# Patient Record
Sex: FEMALE | Race: ASIAN | NOT HISPANIC OR LATINO
[De-identification: names, ages, dates, MRNs, and addresses within clinical notes are randomized per-mention and may not be internally consistent; named-entity substitution may affect disease eponyms.]

---

## 2017-01-21 ENCOUNTER — APPOINTMENT (OUTPATIENT)
Dept: INTERNAL MEDICINE | Facility: CLINIC | Age: 62
End: 2017-01-21

## 2017-01-21 ENCOUNTER — LABORATORY RESULT (OUTPATIENT)
Age: 62
End: 2017-01-21

## 2017-01-21 VITALS
DIASTOLIC BLOOD PRESSURE: 70 MMHG | SYSTOLIC BLOOD PRESSURE: 98 MMHG | WEIGHT: 101 LBS | BODY MASS INDEX: 19.07 KG/M2 | HEIGHT: 61 IN | TEMPERATURE: 97.8 F

## 2017-01-27 LAB
ALBUMIN SERPL ELPH-MCNC: 4.4 G/DL
ALP BLD-CCNC: 50 U/L
ALT SERPL-CCNC: 11 U/L
ANION GAP SERPL CALC-SCNC: 18 MMOL/L
AST SERPL-CCNC: 13 U/L
BILIRUB SERPL-MCNC: 0.3 MG/DL
BUN SERPL-MCNC: 19 MG/DL
CALCIUM SERPL-MCNC: 9.9 MG/DL
CHLORIDE SERPL-SCNC: 101 MMOL/L
CO2 SERPL-SCNC: 23 MMOL/L
CREAT SERPL-MCNC: 0.69 MG/DL
GLUCOSE SERPL-MCNC: 119 MG/DL
HBA1C MFR BLD HPLC: 6.7 %
POTASSIUM SERPL-SCNC: 4.6 MMOL/L
PROT SERPL-MCNC: 7.5 G/DL
SODIUM SERPL-SCNC: 142 MMOL/L
T4 FREE SERPL-MCNC: 1.6 NG/DL
TSH SERPL-ACNC: 1.88 UIU/ML

## 2017-03-04 ENCOUNTER — APPOINTMENT (OUTPATIENT)
Dept: INTERNAL MEDICINE | Facility: CLINIC | Age: 62
End: 2017-03-04

## 2017-04-22 ENCOUNTER — APPOINTMENT (OUTPATIENT)
Dept: INTERNAL MEDICINE | Facility: CLINIC | Age: 62
End: 2017-04-22

## 2017-04-22 ENCOUNTER — LABORATORY RESULT (OUTPATIENT)
Age: 62
End: 2017-04-22

## 2017-04-22 VITALS
DIASTOLIC BLOOD PRESSURE: 70 MMHG | HEIGHT: 61 IN | WEIGHT: 100 LBS | BODY MASS INDEX: 18.88 KG/M2 | SYSTOLIC BLOOD PRESSURE: 112 MMHG

## 2017-04-26 LAB
ALBUMIN SERPL ELPH-MCNC: 4.3 G/DL
ALP BLD-CCNC: 53 U/L
ALT SERPL-CCNC: 17 U/L
ANION GAP SERPL CALC-SCNC: 14 MMOL/L
AST SERPL-CCNC: 18 U/L
BILIRUB SERPL-MCNC: 0.3 MG/DL
BUN SERPL-MCNC: 11 MG/DL
CALCIUM SERPL-MCNC: 9.9 MG/DL
CHLORIDE SERPL-SCNC: 105 MMOL/L
CO2 SERPL-SCNC: 24 MMOL/L
CREAT SERPL-MCNC: 0.58 MG/DL
GLUCOSE SERPL-MCNC: 96 MG/DL
HBA1C MFR BLD HPLC: 6.5 %
POTASSIUM SERPL-SCNC: 4.6 MMOL/L
PROT SERPL-MCNC: 7.3 G/DL
SODIUM SERPL-SCNC: 144 MMOL/L
T4 FREE SERPL-MCNC: 2.1 NG/DL
TSH SERPL-ACNC: 0.03 UIU/ML

## 2017-05-18 ENCOUNTER — LABORATORY RESULT (OUTPATIENT)
Age: 62
End: 2017-05-18

## 2017-05-18 ENCOUNTER — APPOINTMENT (OUTPATIENT)
Dept: INTERNAL MEDICINE | Facility: CLINIC | Age: 62
End: 2017-05-18

## 2017-05-18 VITALS
TEMPERATURE: 97.4 F | BODY MASS INDEX: 18.69 KG/M2 | SYSTOLIC BLOOD PRESSURE: 90 MMHG | DIASTOLIC BLOOD PRESSURE: 60 MMHG | WEIGHT: 99 LBS | HEIGHT: 61 IN

## 2017-05-19 LAB
ALBUMIN SERPL ELPH-MCNC: 4.3 G/DL
ALP BLD-CCNC: 53 U/L
ALT SERPL-CCNC: 14 U/L
ANION GAP SERPL CALC-SCNC: 19 MMOL/L
AST SERPL-CCNC: 21 U/L
BASOPHILS # BLD AUTO: 0.06 K/UL
BASOPHILS NFR BLD AUTO: 0.7 %
BILIRUB SERPL-MCNC: 0.2 MG/DL
BUN SERPL-MCNC: 14 MG/DL
CALCIUM SERPL-MCNC: 9.4 MG/DL
CHLORIDE SERPL-SCNC: 100 MMOL/L
CO2 SERPL-SCNC: 21 MMOL/L
CREAT SERPL-MCNC: 0.58 MG/DL
EOSINOPHIL # BLD AUTO: 0.71 K/UL
EOSINOPHIL NFR BLD AUTO: 7.8 %
GLUCOSE SERPL-MCNC: 170 MG/DL
HCT VFR BLD CALC: 38.2 %
HGB BLD-MCNC: 12.5 G/DL
IMM GRANULOCYTES NFR BLD AUTO: 0.1 %
LYMPHOCYTES # BLD AUTO: 2.85 K/UL
LYMPHOCYTES NFR BLD AUTO: 31.1 %
MAN DIFF?: NORMAL
MCHC RBC-ENTMCNC: 31.8 PG
MCHC RBC-ENTMCNC: 32.7 GM/DL
MCV RBC AUTO: 97.2 FL
MONOCYTES # BLD AUTO: 0.61 K/UL
MONOCYTES NFR BLD AUTO: 6.7 %
NEUTROPHILS # BLD AUTO: 4.91 K/UL
NEUTROPHILS NFR BLD AUTO: 53.6 %
PLATELET # BLD AUTO: 349 K/UL
POTASSIUM SERPL-SCNC: 4.5 MMOL/L
PROT SERPL-MCNC: 7.1 G/DL
RBC # BLD: 3.93 M/UL
RBC # FLD: 11.8 %
SODIUM SERPL-SCNC: 140 MMOL/L
WBC # FLD AUTO: 9.15 K/UL

## 2017-06-13 ENCOUNTER — CLINICAL ADVICE (OUTPATIENT)
Age: 62
End: 2017-06-13

## 2017-06-13 ENCOUNTER — APPOINTMENT (OUTPATIENT)
Dept: INTERNAL MEDICINE | Facility: CLINIC | Age: 62
End: 2017-06-13

## 2017-06-13 ENCOUNTER — LABORATORY RESULT (OUTPATIENT)
Age: 62
End: 2017-06-13

## 2017-06-13 VITALS
DIASTOLIC BLOOD PRESSURE: 70 MMHG | WEIGHT: 95 LBS | SYSTOLIC BLOOD PRESSURE: 106 MMHG | HEIGHT: 61 IN | BODY MASS INDEX: 17.94 KG/M2

## 2017-06-19 LAB
ALBUMIN SERPL ELPH-MCNC: 4.4 G/DL
ALP BLD-CCNC: 56 U/L
ALT SERPL-CCNC: 19 U/L
ANION GAP SERPL CALC-SCNC: 16 MMOL/L
AST SERPL-CCNC: 27 U/L
BASOPHILS # BLD AUTO: 0.1 K/UL
BASOPHILS NFR BLD AUTO: 1.4 %
BILIRUB SERPL-MCNC: 0.3 MG/DL
BUN SERPL-MCNC: 13 MG/DL
CALCIUM SERPL-MCNC: 10.1 MG/DL
CHLORIDE SERPL-SCNC: 102 MMOL/L
CHOLEST SERPL-MCNC: 143 MG/DL
CHOLEST/HDLC SERPL: 3 RATIO
CO2 SERPL-SCNC: 24 MMOL/L
CREAT SERPL-MCNC: 0.67 MG/DL
CREAT SPEC-SCNC: 54 MG/DL
EOSINOPHIL # BLD AUTO: 0.52 K/UL
EOSINOPHIL NFR BLD AUTO: 7.5 %
GLUCOSE SERPL-MCNC: 90 MG/DL
HBA1C MFR BLD HPLC: 6.9 %
HCT VFR BLD CALC: 38.3 %
HDLC SERPL-MCNC: 47 MG/DL
HGB BLD-MCNC: 12.4 G/DL
IMM GRANULOCYTES NFR BLD AUTO: 0 %
LDLC SERPL CALC-MCNC: 78 MG/DL
LYMPHOCYTES # BLD AUTO: 3.04 K/UL
LYMPHOCYTES NFR BLD AUTO: 43.8 %
MAN DIFF?: NORMAL
MCHC RBC-ENTMCNC: 31.3 PG
MCHC RBC-ENTMCNC: 32.4 GM/DL
MCV RBC AUTO: 96.7 FL
MICROALBUMIN 24H UR DL<=1MG/L-MCNC: 0.3 MG/DL
MICROALBUMIN/CREAT 24H UR-RTO: 6 MG/G
MONOCYTES # BLD AUTO: 0.58 K/UL
MONOCYTES NFR BLD AUTO: 8.4 %
NEUTROPHILS # BLD AUTO: 2.7 K/UL
NEUTROPHILS NFR BLD AUTO: 38.9 %
PLATELET # BLD AUTO: 360 K/UL
POTASSIUM SERPL-SCNC: 4.6 MMOL/L
PROT SERPL-MCNC: 7.6 G/DL
RBC # BLD: 3.96 M/UL
RBC # FLD: 11.9 %
SODIUM SERPL-SCNC: 142 MMOL/L
T4 FREE SERPL-MCNC: 1.2 NG/DL
TRIGL SERPL-MCNC: 89 MG/DL
TSH SERPL-ACNC: 3.03 UIU/ML
WBC # FLD AUTO: 6.94 K/UL

## 2017-09-12 ENCOUNTER — LABORATORY RESULT (OUTPATIENT)
Age: 62
End: 2017-09-12

## 2017-09-12 ENCOUNTER — APPOINTMENT (OUTPATIENT)
Dept: INTERNAL MEDICINE | Facility: CLINIC | Age: 62
End: 2017-09-12
Payer: MEDICAID

## 2017-09-12 VITALS
BODY MASS INDEX: 18.5 KG/M2 | HEIGHT: 61 IN | SYSTOLIC BLOOD PRESSURE: 98 MMHG | WEIGHT: 98 LBS | DIASTOLIC BLOOD PRESSURE: 70 MMHG

## 2017-09-12 PROCEDURE — 99213 OFFICE O/P EST LOW 20 MIN: CPT | Mod: 25

## 2017-09-12 PROCEDURE — 36415 COLL VENOUS BLD VENIPUNCTURE: CPT

## 2017-09-12 PROCEDURE — 90688 IIV4 VACCINE SPLT 0.5 ML IM: CPT

## 2017-09-12 PROCEDURE — G0008: CPT

## 2017-09-17 LAB
HBA1C MFR BLD HPLC: 6.5 %
T4 FREE SERPL-MCNC: 1.3 NG/DL
TSH SERPL-ACNC: 2.87 UIU/ML

## 2017-11-13 ENCOUNTER — RX RENEWAL (OUTPATIENT)
Age: 62
End: 2017-11-13

## 2018-01-09 ENCOUNTER — APPOINTMENT (OUTPATIENT)
Dept: INTERNAL MEDICINE | Facility: CLINIC | Age: 63
End: 2018-01-09
Payer: MEDICAID

## 2018-01-09 ENCOUNTER — LABORATORY RESULT (OUTPATIENT)
Age: 63
End: 2018-01-09

## 2018-01-09 VITALS
BODY MASS INDEX: 18.5 KG/M2 | HEIGHT: 61 IN | DIASTOLIC BLOOD PRESSURE: 58 MMHG | WEIGHT: 98 LBS | SYSTOLIC BLOOD PRESSURE: 90 MMHG

## 2018-01-09 PROCEDURE — 99213 OFFICE O/P EST LOW 20 MIN: CPT | Mod: 25

## 2018-01-09 PROCEDURE — 36415 COLL VENOUS BLD VENIPUNCTURE: CPT

## 2018-01-12 LAB
ALBUMIN SERPL ELPH-MCNC: 4.3 G/DL
ALP BLD-CCNC: 58 U/L
ALT SERPL-CCNC: 17 U/L
ANION GAP SERPL CALC-SCNC: 13 MMOL/L
AST SERPL-CCNC: 19 U/L
BILIRUB SERPL-MCNC: 0.2 MG/DL
BUN SERPL-MCNC: 13 MG/DL
CALCIUM SERPL-MCNC: 9.9 MG/DL
CHLORIDE SERPL-SCNC: 101 MMOL/L
CO2 SERPL-SCNC: 27 MMOL/L
CREAT SERPL-MCNC: 0.64 MG/DL
GLUCOSE SERPL-MCNC: 104 MG/DL
HBA1C MFR BLD HPLC: 6.9 %
POTASSIUM SERPL-SCNC: 4.1 MMOL/L
PROT SERPL-MCNC: 7.9 G/DL
SODIUM SERPL-SCNC: 141 MMOL/L
T4 FREE SERPL-MCNC: 1.2 NG/DL
TSH SERPL-ACNC: 6.19 UIU/ML

## 2018-04-22 ENCOUNTER — RX RENEWAL (OUTPATIENT)
Age: 63
End: 2018-04-22

## 2018-04-23 ENCOUNTER — RX RENEWAL (OUTPATIENT)
Age: 63
End: 2018-04-23

## 2018-05-17 ENCOUNTER — LABORATORY RESULT (OUTPATIENT)
Age: 63
End: 2018-05-17

## 2018-05-17 ENCOUNTER — APPOINTMENT (OUTPATIENT)
Dept: INTERNAL MEDICINE | Facility: CLINIC | Age: 63
End: 2018-05-17
Payer: MEDICAID

## 2018-05-17 VITALS
WEIGHT: 98 LBS | SYSTOLIC BLOOD PRESSURE: 90 MMHG | DIASTOLIC BLOOD PRESSURE: 50 MMHG | HEIGHT: 61 IN | BODY MASS INDEX: 18.5 KG/M2

## 2018-05-17 PROCEDURE — 36415 COLL VENOUS BLD VENIPUNCTURE: CPT

## 2018-05-17 PROCEDURE — 99213 OFFICE O/P EST LOW 20 MIN: CPT | Mod: 25

## 2018-05-17 NOTE — PHYSICAL EXAM
[No Acute Distress] : no acute distress [Well Nourished] : well nourished [Well Developed] : well developed [Well-Appearing] : well-appearing [Normal Outer Ear/Nose] : the outer ears and nose were normal in appearance [Supple] : supple [No Respiratory Distress] : no respiratory distress  [No Edema] : there was no peripheral edema [Normal Gait] : normal gait [Normal Affect] : the affect was normal [Normal Mood] : the mood was normal [Right Foot Was Examined] : Right foot ~C was examined [Left Foot Was Examined] : left foot ~C was examined [None] : no ulcers in either foot were found

## 2018-05-17 NOTE — ASSESSMENT
[FreeTextEntry1] : Continue same thyroid medication.  TFT.\par Continue same  diabetes medication.  Hemoglobin A1C.  urine albumin/creat.

## 2018-05-28 LAB
ALBUMIN SERPL ELPH-MCNC: 4 G/DL
ALP BLD-CCNC: 49 U/L
ALT SERPL-CCNC: 11 U/L
ANION GAP SERPL CALC-SCNC: 12 MMOL/L
AST SERPL-CCNC: 18 U/L
BILIRUB SERPL-MCNC: 0.3 MG/DL
BUN SERPL-MCNC: 20 MG/DL
CALCIUM SERPL-MCNC: 9.6 MG/DL
CHLORIDE SERPL-SCNC: 101 MMOL/L
CO2 SERPL-SCNC: 25 MMOL/L
CREAT SERPL-MCNC: 0.56 MG/DL
CREAT SPEC-SCNC: 48 MG/DL
GLUCOSE SERPL-MCNC: 106 MG/DL
HBA1C MFR BLD HPLC: 7.1 %
MICROALBUMIN 24H UR DL<=1MG/L-MCNC: 0.4 MG/DL
MICROALBUMIN/CREAT 24H UR-RTO: 8 MG/G
POTASSIUM SERPL-SCNC: 4.5 MMOL/L
PROT SERPL-MCNC: 7.7 G/DL
SODIUM SERPL-SCNC: 138 MMOL/L
T4 FREE SERPL-MCNC: 1.4 NG/DL
TSH SERPL-ACNC: 2.27 UIU/ML

## 2018-06-19 ENCOUNTER — RX RENEWAL (OUTPATIENT)
Age: 63
End: 2018-06-19

## 2018-08-16 ENCOUNTER — RX RENEWAL (OUTPATIENT)
Age: 63
End: 2018-08-16

## 2018-09-20 ENCOUNTER — LABORATORY RESULT (OUTPATIENT)
Age: 63
End: 2018-09-20

## 2018-09-20 ENCOUNTER — APPOINTMENT (OUTPATIENT)
Dept: INTERNAL MEDICINE | Facility: CLINIC | Age: 63
End: 2018-09-20
Payer: MEDICAID

## 2018-09-20 VITALS
HEIGHT: 61 IN | HEART RATE: 61 BPM | BODY MASS INDEX: 19.07 KG/M2 | DIASTOLIC BLOOD PRESSURE: 40 MMHG | WEIGHT: 101 LBS | SYSTOLIC BLOOD PRESSURE: 120 MMHG

## 2018-09-20 PROCEDURE — 99396 PREV VISIT EST AGE 40-64: CPT | Mod: 25

## 2018-09-20 PROCEDURE — 90686 IIV4 VACC NO PRSV 0.5 ML IM: CPT

## 2018-09-20 PROCEDURE — 93000 ELECTROCARDIOGRAM COMPLETE: CPT

## 2018-09-20 PROCEDURE — 36415 COLL VENOUS BLD VENIPUNCTURE: CPT

## 2018-09-20 PROCEDURE — 94010 BREATHING CAPACITY TEST: CPT

## 2018-09-20 PROCEDURE — G0008: CPT

## 2018-09-20 NOTE — HISTORY OF PRESENT ILLNESS
[Spouse] : spouse [FreeTextEntry1] : Diabetes and hypothyroid [de-identified] : no complaints\par

## 2018-09-20 NOTE — PHYSICAL EXAM
[No Acute Distress] : no acute distress [Well Nourished] : well nourished [Well Developed] : well developed [Well-Appearing] : well-appearing [Normal Outer Ear/Nose] : the outer ears and nose were normal in appearance [Supple] : supple [No Respiratory Distress] : no respiratory distress  [Clear to Auscultation] : lungs were clear to auscultation bilaterally [Normal Rate] : normal rate  [Regular Rhythm] : with a regular rhythm [No Edema] : there was no peripheral edema [Soft] : abdomen soft [Non Tender] : non-tender [Normal Posterior Cervical Nodes] : no posterior cervical lymphadenopathy [Normal Anterior Cervical Nodes] : no anterior cervical lymphadenopathy [No CVA Tenderness] : no CVA  tenderness [No Spinal Tenderness] : no spinal tenderness [Normal Gait] : normal gait [Normal Affect] : the affect was normal [Normal Mood] : the mood was normal [Cyanosis] : no cyanosis [Abnormal Temperature] : normal temperature

## 2018-09-20 NOTE — ASSESSMENT
[FreeTextEntry1] : Continue same thyroid medication.  TFT.\par Continue same  diabetes medication.  Hemoglobin A1C.\par

## 2018-09-22 ENCOUNTER — RX RENEWAL (OUTPATIENT)
Age: 63
End: 2018-09-22

## 2018-09-23 LAB
ALBUMIN SERPL ELPH-MCNC: 4.5 G/DL
ALP BLD-CCNC: 58 U/L
ALT SERPL-CCNC: 15 U/L
ANION GAP SERPL CALC-SCNC: 13 MMOL/L
AST SERPL-CCNC: 19 U/L
BASOPHILS # BLD AUTO: 0.08 K/UL
BASOPHILS NFR BLD AUTO: 1.4 %
BILIRUB SERPL-MCNC: 0.3 MG/DL
BUN SERPL-MCNC: 17 MG/DL
CALCIUM SERPL-MCNC: 9.7 MG/DL
CHLORIDE SERPL-SCNC: 99 MMOL/L
CHOLEST SERPL-MCNC: 134 MG/DL
CHOLEST/HDLC SERPL: 3.6 RATIO
CO2 SERPL-SCNC: 27 MMOL/L
CREAT SERPL-MCNC: 0.57 MG/DL
EOSINOPHIL # BLD AUTO: 0.35 K/UL
EOSINOPHIL NFR BLD AUTO: 6.2 %
GLUCOSE SERPL-MCNC: 113 MG/DL
HBA1C MFR BLD HPLC: 7 %
HCT VFR BLD CALC: 37.7 %
HDLC SERPL-MCNC: 37 MG/DL
HGB BLD-MCNC: 12.4 G/DL
IMM GRANULOCYTES NFR BLD AUTO: 0.2 %
LDLC SERPL CALC-MCNC: 72 MG/DL
LYMPHOCYTES # BLD AUTO: 2.16 K/UL
LYMPHOCYTES NFR BLD AUTO: 38.3 %
MAN DIFF?: NORMAL
MCHC RBC-ENTMCNC: 32.2 PG
MCHC RBC-ENTMCNC: 32.9 GM/DL
MCV RBC AUTO: 97.9 FL
MONOCYTES # BLD AUTO: 0.48 K/UL
MONOCYTES NFR BLD AUTO: 8.5 %
NEUTROPHILS # BLD AUTO: 2.56 K/UL
NEUTROPHILS NFR BLD AUTO: 45.4 %
PLATELET # BLD AUTO: 340 K/UL
POTASSIUM SERPL-SCNC: 4.4 MMOL/L
PROT SERPL-MCNC: 7.7 G/DL
RBC # BLD: 3.85 M/UL
RBC # FLD: 12.3 %
SODIUM SERPL-SCNC: 139 MMOL/L
T4 FREE SERPL-MCNC: 1.2 NG/DL
TRIGL SERPL-MCNC: 124 MG/DL
TSH SERPL-ACNC: 3.93 UIU/ML
WBC # FLD AUTO: 5.64 K/UL

## 2018-11-06 ENCOUNTER — EMERGENCY (EMERGENCY)
Facility: HOSPITAL | Age: 63
LOS: 1 days | Discharge: HOME | End: 2018-11-06
Attending: EMERGENCY MEDICINE
Payer: SELF-PAY

## 2018-11-06 VITALS
HEART RATE: 80 BPM | DIASTOLIC BLOOD PRESSURE: 64 MMHG | SYSTOLIC BLOOD PRESSURE: 145 MMHG | TEMPERATURE: 98 F | HEIGHT: 59.06 IN | RESPIRATION RATE: 16 BRPM | OXYGEN SATURATION: 97 % | WEIGHT: 104.94 LBS

## 2018-11-06 PROCEDURE — 99283 EMERGENCY DEPT VISIT LOW MDM: CPT

## 2018-11-06 PROCEDURE — 99284 EMERGENCY DEPT VISIT MOD MDM: CPT
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
## 2018-11-06 NOTE — ED PROVIDER NOTE - PLAN OF CARE
1. follow up with your PMD in 24-48 hours  2. take all home medications as prescribed, keep hydrated  3. if you have any worsening or concerning symptoms dizziness, change in vision slurred speech, changes in sensation, weakness, or changes in balance return to the ED.
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
## 2019-01-17 ENCOUNTER — APPOINTMENT (OUTPATIENT)
Dept: INTERNAL MEDICINE | Facility: CLINIC | Age: 64
End: 2019-01-17
Payer: MEDICAID

## 2019-01-17 ENCOUNTER — LABORATORY RESULT (OUTPATIENT)
Age: 64
End: 2019-01-17

## 2019-01-17 VITALS
SYSTOLIC BLOOD PRESSURE: 76 MMHG | WEIGHT: 102 LBS | BODY MASS INDEX: 19.26 KG/M2 | HEIGHT: 61 IN | DIASTOLIC BLOOD PRESSURE: 58 MMHG

## 2019-01-17 VITALS — DIASTOLIC BLOOD PRESSURE: 60 MMHG | SYSTOLIC BLOOD PRESSURE: 100 MMHG

## 2019-01-17 PROCEDURE — 99214 OFFICE O/P EST MOD 30 MIN: CPT | Mod: 25

## 2019-01-17 PROCEDURE — 36415 COLL VENOUS BLD VENIPUNCTURE: CPT

## 2019-01-17 NOTE — ASSESSMENT
[FreeTextEntry1] : Continue same  diabetes medication.  Hemoglobin A1C.  Want A1C<7.  Patient refuses second diabetes medication.\par Continue same thyroid medication.  TFT.\par RX shingrix

## 2019-01-17 NOTE — PHYSICAL EXAM
[No Acute Distress] : no acute distress [Well Nourished] : well nourished [Well Developed] : well developed [Well-Appearing] : well-appearing [Normal Outer Ear/Nose] : the outer ears and nose were normal in appearance [Supple] : supple [No Respiratory Distress] : no respiratory distress  [Clear to Auscultation] : lungs were clear to auscultation bilaterally [Soft] : abdomen soft [No CVA Tenderness] : no CVA  tenderness [Cyanosis] : no cyanosis [Abnormal Temperature] : normal temperature [Normal Gait] : normal gait [Normal Affect] : the affect was normal [Normal Mood] : the mood was normal

## 2019-01-21 LAB
ALBUMIN SERPL ELPH-MCNC: 4.7 G/DL
ALP BLD-CCNC: 60 U/L
ALT SERPL-CCNC: 16 U/L
ANION GAP SERPL CALC-SCNC: 13 MMOL/L
AST SERPL-CCNC: 21 U/L
BILIRUB SERPL-MCNC: 0.4 MG/DL
BUN SERPL-MCNC: 14 MG/DL
CALCIUM SERPL-MCNC: 10.1 MG/DL
CHLORIDE SERPL-SCNC: 102 MMOL/L
CO2 SERPL-SCNC: 26 MMOL/L
CREAT SERPL-MCNC: 0.64 MG/DL
GLUCOSE SERPL-MCNC: 99 MG/DL
HBA1C MFR BLD HPLC: 7.6 %
POTASSIUM SERPL-SCNC: 4.6 MMOL/L
PROT SERPL-MCNC: 7.7 G/DL
SODIUM SERPL-SCNC: 141 MMOL/L
T4 FREE SERPL-MCNC: 1.3 NG/DL
TSH SERPL-ACNC: 4 UIU/ML

## 2019-02-26 ENCOUNTER — RX RENEWAL (OUTPATIENT)
Age: 64
End: 2019-02-26

## 2019-02-28 ENCOUNTER — RX RENEWAL (OUTPATIENT)
Age: 64
End: 2019-02-28

## 2019-02-28 RX ORDER — LANCETS 33 GAUGE
EACH MISCELLANEOUS
Qty: 100 | Refills: 3 | Status: ACTIVE | COMMUNITY
Start: 2018-09-22 | End: 1900-01-01

## 2019-05-28 ENCOUNTER — LABORATORY RESULT (OUTPATIENT)
Age: 64
End: 2019-05-28

## 2019-05-28 ENCOUNTER — APPOINTMENT (OUTPATIENT)
Dept: INTERNAL MEDICINE | Facility: CLINIC | Age: 64
End: 2019-05-28
Payer: MEDICAID

## 2019-05-28 VITALS
DIASTOLIC BLOOD PRESSURE: 60 MMHG | BODY MASS INDEX: 19.63 KG/M2 | WEIGHT: 104 LBS | HEIGHT: 61 IN | SYSTOLIC BLOOD PRESSURE: 100 MMHG

## 2019-05-28 PROCEDURE — 36415 COLL VENOUS BLD VENIPUNCTURE: CPT

## 2019-05-28 PROCEDURE — 99214 OFFICE O/P EST MOD 30 MIN: CPT | Mod: 25

## 2019-05-28 NOTE — HISTORY OF PRESENT ILLNESS
[FreeTextEntry1] : Diabetes, hypothyroid, and anemia [de-identified] : 6 months of intermittent right sided posterior neck pain and right shoulder pain. No injury. No fever. No swelling.

## 2019-05-28 NOTE — PHYSICAL EXAM
[No Acute Distress] : no acute distress [Well Nourished] : well nourished [Well Developed] : well developed [Well-Appearing] : well-appearing [Normal Outer Ear/Nose] : the outer ears and nose were normal in appearance [Supple] : supple [No Respiratory Distress] : no respiratory distress  [Clear to Auscultation] : lungs were clear to auscultation bilaterally [Normal Rate] : normal rate  [Regular Rhythm] : with a regular rhythm [No Edema] : there was no peripheral edema [Soft] : abdomen soft [No CVA Tenderness] : no CVA  tenderness [No Spinal Tenderness] : no spinal tenderness [Normal Gait] : normal gait [Speech Grossly Normal] : speech grossly normal [Normal Affect] : the affect was normal [Normal Mood] : the mood was normal [Right Foot Was Examined] : Right foot ~C was examined [Left Foot Was Examined] : left foot ~C was examined [None] : no ulcers in either foot were found [de-identified] : Right shoulder-nontender, no edema, and normal ROM

## 2019-05-28 NOTE — ASSESSMENT
[FreeTextEntry1] : Right posterior neck pain and right shoulder pain-physical therapy\par follow CBC\par Continue same thyroid medication.  TFT.\par Continue same  diabetes medication.  Hemoglobin A1C.  urine alb/creat

## 2019-05-30 LAB
ALBUMIN SERPL ELPH-MCNC: 4.6 G/DL
ALP BLD-CCNC: 54 U/L
ALT SERPL-CCNC: 13 U/L
ANION GAP SERPL CALC-SCNC: 11 MMOL/L
AST SERPL-CCNC: 16 U/L
BILIRUB SERPL-MCNC: 0.3 MG/DL
BUN SERPL-MCNC: 12 MG/DL
CALCIUM SERPL-MCNC: 10 MG/DL
CHLORIDE SERPL-SCNC: 101 MMOL/L
CO2 SERPL-SCNC: 28 MMOL/L
CREAT SERPL-MCNC: 0.55 MG/DL
CREAT SPEC-SCNC: 29 MG/DL
ESTIMATED AVERAGE GLUCOSE: 169 MG/DL
GLUCOSE SERPL-MCNC: 106 MG/DL
HBA1C MFR BLD HPLC: 7.5 %
MICROALBUMIN 24H UR DL<=1MG/L-MCNC: <1.2 MG/DL
MICROALBUMIN/CREAT 24H UR-RTO: NORMAL MG/G
POTASSIUM SERPL-SCNC: 4.2 MMOL/L
PROT SERPL-MCNC: 7.8 G/DL
SODIUM SERPL-SCNC: 140 MMOL/L
T4 FREE SERPL-MCNC: 1.3 NG/DL
TSH SERPL-ACNC: 2.5 UIU/ML

## 2019-06-10 ENCOUNTER — RX RENEWAL (OUTPATIENT)
Age: 64
End: 2019-06-10

## 2019-06-27 ENCOUNTER — APPOINTMENT (OUTPATIENT)
Dept: INTERNAL MEDICINE | Facility: CLINIC | Age: 64
End: 2019-06-27
Payer: MEDICAID

## 2019-06-27 VITALS
HEIGHT: 61 IN | SYSTOLIC BLOOD PRESSURE: 100 MMHG | BODY MASS INDEX: 19.63 KG/M2 | DIASTOLIC BLOOD PRESSURE: 60 MMHG | WEIGHT: 104 LBS

## 2019-06-27 PROCEDURE — 99214 OFFICE O/P EST MOD 30 MIN: CPT

## 2019-06-27 NOTE — ASSESSMENT
[FreeTextEntry1] : Headache-unclear etiology. Possible tension headache. Refer to neurology (Romero)\par Continue same diabetes medication. Follow A1c\par Continue same thyroid medication. Follow TFT

## 2019-06-27 NOTE — HISTORY OF PRESENT ILLNESS
[FreeTextEntry8] : 1 week of bilateral frontal headache. No nausea. No visual change. No fever. No head trauma. Saw ophthalmologist yesterday.

## 2019-06-27 NOTE — PHYSICAL EXAM
[No Acute Distress] : no acute distress [Well Nourished] : well nourished [Well Developed] : well developed [Well-Appearing] : well-appearing [Normal Outer Ear/Nose] : the outer ears and nose were normal in appearance [Supple] : supple [No Respiratory Distress] : no respiratory distress  [Clear to Auscultation] : lungs were clear to auscultation bilaterally [Normal Rate] : normal rate  [Regular Rhythm] : with a regular rhythm [No Edema] : there was no peripheral edema [Non Tender] : non-tender [Soft] : abdomen soft [Normal Supraclavicular Nodes] : no supraclavicular lymphadenopathy [Normal Anterior Cervical Nodes] : no anterior cervical lymphadenopathy [Normal Posterior Cervical Nodes] : no posterior cervical lymphadenopathy [No CVA Tenderness] : no CVA  tenderness [No Spinal Tenderness] : no spinal tenderness [Normal Gait] : normal gait [Speech Grossly Normal] : speech grossly normal [Normal Affect] : the affect was normal [Normal Mood] : the mood was normal

## 2019-06-30 ENCOUNTER — RX RENEWAL (OUTPATIENT)
Age: 64
End: 2019-06-30

## 2019-06-30 RX ORDER — LANCETS 28 GAUGE
EACH MISCELLANEOUS
Qty: 180 | Refills: 3 | Status: ACTIVE | COMMUNITY
Start: 2019-06-30 | End: 1900-01-01

## 2019-07-01 ENCOUNTER — RX RENEWAL (OUTPATIENT)
Age: 64
End: 2019-07-01

## 2019-07-02 ENCOUNTER — RX RENEWAL (OUTPATIENT)
Age: 64
End: 2019-07-02

## 2019-08-14 ENCOUNTER — APPOINTMENT (OUTPATIENT)
Dept: INTERNAL MEDICINE | Facility: CLINIC | Age: 64
End: 2019-08-14

## 2019-08-28 ENCOUNTER — RX RENEWAL (OUTPATIENT)
Age: 64
End: 2019-08-28

## 2019-10-04 ENCOUNTER — EMERGENCY (EMERGENCY)
Facility: HOSPITAL | Age: 64
LOS: 1 days | Discharge: HOME | End: 2019-10-04
Attending: EMERGENCY MEDICINE | Admitting: EMERGENCY MEDICINE
Payer: MEDICAID

## 2019-10-04 VITALS
RESPIRATION RATE: 18 BRPM | OXYGEN SATURATION: 99 % | TEMPERATURE: 99 F | DIASTOLIC BLOOD PRESSURE: 54 MMHG | HEART RATE: 94 BPM | SYSTOLIC BLOOD PRESSURE: 125 MMHG

## 2019-10-04 VITALS
SYSTOLIC BLOOD PRESSURE: 131 MMHG | RESPIRATION RATE: 17 BRPM | OXYGEN SATURATION: 98 % | DIASTOLIC BLOOD PRESSURE: 60 MMHG | TEMPERATURE: 98 F | HEART RATE: 84 BPM

## 2019-10-04 LAB
ALBUMIN SERPL ELPH-MCNC: 4.6 G/DL — SIGNIFICANT CHANGE UP (ref 3.3–5)
ALP SERPL-CCNC: 83 U/L — SIGNIFICANT CHANGE UP (ref 40–120)
ALT FLD-CCNC: 12 U/L — SIGNIFICANT CHANGE UP (ref 4–33)
ANION GAP SERPL CALC-SCNC: 12 MMO/L — SIGNIFICANT CHANGE UP (ref 7–14)
ANISOCYTOSIS BLD QL: SLIGHT — SIGNIFICANT CHANGE UP
AST SERPL-CCNC: 15 U/L — SIGNIFICANT CHANGE UP (ref 4–32)
BASOPHILS # BLD AUTO: 0.18 K/UL — SIGNIFICANT CHANGE UP (ref 0–0.2)
BASOPHILS NFR BLD AUTO: 0.9 % — SIGNIFICANT CHANGE UP (ref 0–2)
BASOPHILS NFR SPEC: 0 % — SIGNIFICANT CHANGE UP (ref 0–2)
BILIRUB SERPL-MCNC: 0.4 MG/DL — SIGNIFICANT CHANGE UP (ref 0.2–1.2)
BLASTS # FLD: 0 % — SIGNIFICANT CHANGE UP (ref 0–0)
BUN SERPL-MCNC: 8 MG/DL — SIGNIFICANT CHANGE UP (ref 7–23)
CALCIUM SERPL-MCNC: 9.6 MG/DL — SIGNIFICANT CHANGE UP (ref 8.4–10.5)
CHLORIDE SERPL-SCNC: 91 MMOL/L — LOW (ref 98–107)
CO2 SERPL-SCNC: 26 MMOL/L — SIGNIFICANT CHANGE UP (ref 22–31)
CREAT SERPL-MCNC: 0.47 MG/DL — LOW (ref 0.5–1.3)
EOSINOPHIL # BLD AUTO: 0.88 K/UL — HIGH (ref 0–0.5)
EOSINOPHIL NFR BLD AUTO: 4.2 % — SIGNIFICANT CHANGE UP (ref 0–6)
EOSINOPHIL NFR FLD: 4.4 % — SIGNIFICANT CHANGE UP (ref 0–6)
GIANT PLATELETS BLD QL SMEAR: PRESENT — SIGNIFICANT CHANGE UP
GLUCOSE SERPL-MCNC: 161 MG/DL — HIGH (ref 70–99)
HCT VFR BLD CALC: 39.7 % — SIGNIFICANT CHANGE UP (ref 34.5–45)
HGB BLD-MCNC: 13.3 G/DL — SIGNIFICANT CHANGE UP (ref 11.5–15.5)
IMM GRANULOCYTES NFR BLD AUTO: 0.4 % — SIGNIFICANT CHANGE UP (ref 0–1.5)
LYMPHOCYTES # BLD AUTO: 15.8 % — SIGNIFICANT CHANGE UP (ref 13–44)
LYMPHOCYTES # BLD AUTO: 3.31 K/UL — HIGH (ref 1–3.3)
LYMPHOCYTES NFR SPEC AUTO: 7.8 % — LOW (ref 13–44)
MACROCYTES BLD QL: SLIGHT — SIGNIFICANT CHANGE UP
MCHC RBC-ENTMCNC: 31.6 PG — SIGNIFICANT CHANGE UP (ref 27–34)
MCHC RBC-ENTMCNC: 33.5 % — SIGNIFICANT CHANGE UP (ref 32–36)
MCV RBC AUTO: 94.3 FL — SIGNIFICANT CHANGE UP (ref 80–100)
METAMYELOCYTES # FLD: 0 % — SIGNIFICANT CHANGE UP (ref 0–1)
MONOCYTES # BLD AUTO: 1.74 K/UL — HIGH (ref 0–0.9)
MONOCYTES NFR BLD AUTO: 8.3 % — SIGNIFICANT CHANGE UP (ref 2–14)
MONOCYTES NFR BLD: 10.4 % — HIGH (ref 2–9)
MYELOCYTES NFR BLD: 0 % — SIGNIFICANT CHANGE UP (ref 0–0)
NEUTROPHIL AB SER-ACNC: 73 % — SIGNIFICANT CHANGE UP (ref 43–77)
NEUTROPHILS # BLD AUTO: 14.81 K/UL — HIGH (ref 1.8–7.4)
NEUTROPHILS NFR BLD AUTO: 70.4 % — SIGNIFICANT CHANGE UP (ref 43–77)
NEUTS BAND # BLD: 0.9 % — SIGNIFICANT CHANGE UP (ref 0–6)
NRBC # FLD: 0 K/UL — SIGNIFICANT CHANGE UP (ref 0–0)
OTHER - HEMATOLOGY %: 0 — SIGNIFICANT CHANGE UP
PLATELET # BLD AUTO: 561 K/UL — HIGH (ref 150–400)
PLATELET COUNT - ESTIMATE: SIGNIFICANT CHANGE UP
PMV BLD: 9.7 FL — SIGNIFICANT CHANGE UP (ref 7–13)
POLYCHROMASIA BLD QL SMEAR: SLIGHT — SIGNIFICANT CHANGE UP
POTASSIUM SERPL-MCNC: 4.6 MMOL/L — SIGNIFICANT CHANGE UP (ref 3.5–5.3)
POTASSIUM SERPL-SCNC: 4.6 MMOL/L — SIGNIFICANT CHANGE UP (ref 3.5–5.3)
PROMYELOCYTES # FLD: 0 % — SIGNIFICANT CHANGE UP (ref 0–0)
PROT SERPL-MCNC: 8.6 G/DL — HIGH (ref 6–8.3)
RBC # BLD: 4.21 M/UL — SIGNIFICANT CHANGE UP (ref 3.8–5.2)
RBC # FLD: 12.9 % — SIGNIFICANT CHANGE UP (ref 10.3–14.5)
REVIEW TO FOLLOW: YES — SIGNIFICANT CHANGE UP
SODIUM SERPL-SCNC: 129 MMOL/L — LOW (ref 135–145)
VARIANT LYMPHS # BLD: 3.5 % — SIGNIFICANT CHANGE UP
WBC # BLD: 21 K/UL — HIGH (ref 3.8–10.5)
WBC # FLD AUTO: 21 K/UL — HIGH (ref 3.8–10.5)

## 2019-10-04 PROCEDURE — 70487 CT MAXILLOFACIAL W/DYE: CPT | Mod: 26

## 2019-10-04 PROCEDURE — 99284 EMERGENCY DEPT VISIT MOD MDM: CPT

## 2019-10-04 RX ORDER — SODIUM CHLORIDE 0.65 %
1 AEROSOL, SPRAY (ML) NASAL
Qty: 1 | Refills: 0
Start: 2019-10-04 | End: 2019-10-17

## 2019-10-04 RX ORDER — SODIUM CHLORIDE 9 MG/ML
1000 INJECTION, SOLUTION INTRAVENOUS ONCE
Refills: 0 | Status: COMPLETED | OUTPATIENT
Start: 2019-10-04 | End: 2019-10-04

## 2019-10-04 RX ADMIN — SODIUM CHLORIDE 1000 MILLILITER(S): 9 INJECTION, SOLUTION INTRAVENOUS at 14:48

## 2019-10-04 NOTE — PROGRESS NOTE ADULT - SUBJECTIVE AND OBJECTIVE BOX
Reason for Consultation: Nasal congestion, neck swelling  Requested by: ED      HPI:  64 female presents to ED with concern for persistent sinus symptoms for 3 months and 1 week of neck swelling.   ORL consulted for further evaluation.    Describes 3 months of nasal congestion, anterior rhinorrhea sinus pressure, headaches poor sense of smell. Has seen an outside ENT. Prescribed 2 courses of antibiotics and steroids. Somewhat relieves symptoms but symptoms return. Had tried Flonase inconsistently. Intermittent use of nasal saline No other medications. Described environmental allergies. Had allergy testing. Sometimes uses allegra. Nasal congestion forces her to breath through her mouth. Has had very dry mouth recently. Noticed swelling on both sides of the neck for 1 week. Denies history of similar symptoms. No fevers, no chills, no nausea, no vomiting, no dyspnea, no voice changes, no dysphagia, no hemoptysis  no ear pain.     REVIEW OF SYSTEMS:  See HPI    Vital Signs Last 24 Hrs  T(C): 36.4 (04 Oct 2019 15:56), Max: 37.1 (04 Oct 2019 10:04)  T(F): 97.6 (04 Oct 2019 15:56), Max: 98.7 (04 Oct 2019 10:04)  HR: 84 (04 Oct 2019 15:56) (84 - 94)  BP: 131/60 (04 Oct 2019 15:56) (103/59 - 131/60)  BP(mean): --  RR: 17 (04 Oct 2019 15:56) (17 - 18)  SpO2: 98% (04 Oct 2019 15:56) (97% - 99%)    PHYSICAL EXAM:  Constitutional Normal: well nourished, well developed, non-dysmorphic, no acute distress  External Nose:  Normal, no structural deformities	  Anterior Nasal Cavity: hypertrophic mucosa, mucous filling bilateral nasal cavities				  Oral Cavity:  Good dentition, tongue midline, no lesions or ulcerations  Tonsilar Size: 2+ bilaterally  Neck: bilateral moderatly swollen submandibular glands, mildly tender, no overlying skin changes, no purulent drainage from whartons duct bilateral no palpable stones   Pulmonary: No Acute Distress.   Neurologic: awake and alert      A/P  64 female presents to ED with concern for persistent sinus symptoms for 3 months and 1 week of neck swelling.   ORL consulted for further evaluation.  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

## 2019-10-04 NOTE — ED PROVIDER NOTE - NSFOLLOWUPINSTRUCTIONS_ED_ALL_ED_FT
You were seen in the Emergency Department for chronic sinusitis.   1) Advance activity as tolerated.    2) Please  your prescriptions at your pharmacy.  3) Please get sour sucking candys (like Warheads) to help alleviate the swelling in your salivary glands  4) Please follow up with you ENT this week. I have also attached the contact as well  5) Return to the Emergency Department for worsening or persistent symptoms fever, trouble breathing, and/or ANY NEW OR CONCERNING SYMPTOMS. If you have issues obtaining follow up, please call: [phone] to obtain a doctor or specialist who takes your insurance in your area.

## 2019-10-04 NOTE — ED PROVIDER NOTE - NS ED ROS FT
Gen: Denies fever, weight loss  CV: Denies chest pain, palpitations  HEENT: + submandibular swelling, + sore throat, + eye swelling  Skin: + psoriatic rash on foot, denies erythema, color changes  Resp: Denies SOB, cough  Endo: Denies sensitivity to heat, cold, increased urination  GI: Denies constipation, nausea, vomiting  Msk: Denies back pain, LE swelling, extremity pain  : Denies dysuria, increased frequency  Neuro: Denies LOC, weakness, seizures  Psych: Denies hx of psych, hallucinations, SI

## 2019-10-04 NOTE — ED PROVIDER NOTE - CLINICAL SUMMARY MEDICAL DECISION MAKING FREE TEXT BOX
Joseph Frankel PGY1: 63 yo with chronic sinusitis presents with worsening of sinus congestion. VSS. Patient looks well and is non toxic appearing however she has had significant sinus congestion. PE otherwise unremarkable. Given new onset swelling will get CT maxillofacial with IV contrast. Will also get basic labs. No concern for acute airway loss at this time. Will follow up

## 2019-10-04 NOTE — ED PROVIDER NOTE - OBJECTIVE STATEMENT
63 yo previously healthy female presenting with 3 months of sinus congestion, acutely worse in the past few days. Patient states that she started to have sinus congestion about 3 months ago. She has seen primary care and ENT who has prescribed amoxicillin and amoxicillin/clavulanate with no improvement. She also took a 10 days course of prednisone with improvement in her symptoms but soon after began to have symptoms again. Patient has an appointment with ENT for next week but could not wait as she has noticed swelling and pain under her mandible and she has noticed a change in her voice. Is able to swallow. States that she can only breath through her mouth. Denies fevers, cough. Denies weight loss.

## 2019-10-04 NOTE — ED PROVIDER NOTE - NSFOLLOWUPCLINICS_GEN_ALL_ED_FT
[facility] - ENT  Otolaryngology (ENT)  [address] [place] [address]  Phone: [phone]  Fax:   Follow Up Time:

## 2019-10-04 NOTE — ED PROVIDER NOTE - PHYSICAL EXAMINATION
Gen: Alert and oriented. Lying comfortably in bed. Answering questions appropriately  HEENT: PEERL, extra occular movements intact, sinus congestion and tenderness, TMs not able to be viewed secondary to cerumen. Uvula swollen but midline. Extensive bilateral submandibular swelling and tenderness. mucous membranes moist  CV: Regular rate and rhythm, +S1/S2, no murmurs/rubs/gallops,   Resp: Clear to ausculation bilaterally, no wheezes/rhonchi/rales  GI: Abdomen soft non-distended, non tender to palpation, no masses  MSK: No open wounds, no bruising, no LE edema, Homans sign negative bl  Neuro: A&Ox4, following commands, moving all four extremities spontaneously  Psych: appropriate mood

## 2019-10-04 NOTE — ED ADULT NURSE NOTE - OBJECTIVE STATEMENT
pt came with severe sinus connection, and troat swelling , nasal drainage noted . resting  waiting for ct scan

## 2019-10-04 NOTE — ED PROVIDER NOTE - ATTENDING CONTRIBUTION TO CARE
agree with resident note  "65 yo previously healthy female presenting with 3 months of sinus congestion, acutely worse in the past few days. Patient states that she started to have sinus congestion about 3 months ago. She has seen primary care and ENT who has prescribed amoxicillin and amoxicillin/clavulanate with no improvement. She also took a 10 days course of prednisone with improvement in her symptoms but soon after began to have symptoms again. "  Pt had nasal biopsy done yesterday.  Has noticed increased swelling in submandibular glands as well.  Denies weight loss, fever.    PE: nasal congestion, sinus tenderness, multiple enlarged glands ?lymph nodes; no drainage from nose; afebrile, not toxic appearing; CTAB/L; s1 s2 no m/r/g abd soft/NT/ND     Imp: significant chronic sinusitis; will get Ct with contrast and reassess

## 2019-10-04 NOTE — ED PROVIDER NOTE - PATIENT PORTAL LINK FT
You can access the FollowMyHealth Patient Portal offered by [facility] by registering at the following website: [email or web address]. By joining [facility]’s FollowMyHealth portal, you will also be able to view your health information using other applications (apps) compatible with our system.

## 2019-10-07 ENCOUNTER — LABORATORY RESULT (OUTPATIENT)
Age: 64
End: 2019-10-07

## 2019-10-07 ENCOUNTER — APPOINTMENT (OUTPATIENT)
Dept: OTOLARYNGOLOGY | Facility: CLINIC | Age: 64
End: 2019-10-07
Payer: MEDICAID

## 2019-10-07 VITALS
DIASTOLIC BLOOD PRESSURE: 73 MMHG | BODY MASS INDEX: 18.88 KG/M2 | HEIGHT: 61 IN | WEIGHT: 100 LBS | SYSTOLIC BLOOD PRESSURE: 108 MMHG | HEART RATE: 98 BPM

## 2019-10-07 PROCEDURE — 31231 NASAL ENDOSCOPY DX: CPT

## 2019-10-07 PROCEDURE — 99204 OFFICE O/P NEW MOD 45 MIN: CPT | Mod: 25

## 2019-10-08 ENCOUNTER — NON-APPOINTMENT (OUTPATIENT)
Age: 64
End: 2019-10-08

## 2019-10-08 ENCOUNTER — APPOINTMENT (OUTPATIENT)
Dept: OPHTHALMOLOGY | Facility: CLINIC | Age: 64
End: 2019-10-08
Payer: MEDICAID

## 2019-10-08 ENCOUNTER — APPOINTMENT (OUTPATIENT)
Dept: INTERNAL MEDICINE | Facility: CLINIC | Age: 64
End: 2019-10-08
Payer: MEDICAID

## 2019-10-08 ENCOUNTER — LABORATORY RESULT (OUTPATIENT)
Age: 64
End: 2019-10-08

## 2019-10-08 VITALS
SYSTOLIC BLOOD PRESSURE: 110 MMHG | WEIGHT: 94 LBS | DIASTOLIC BLOOD PRESSURE: 70 MMHG | BODY MASS INDEX: 17.75 KG/M2 | HEIGHT: 61 IN

## 2019-10-08 PROCEDURE — 94010 BREATHING CAPACITY TEST: CPT

## 2019-10-08 PROCEDURE — 99396 PREV VISIT EST AGE 40-64: CPT | Mod: 25

## 2019-10-08 PROCEDURE — 92004 COMPRE OPH EXAM NEW PT 1/>: CPT

## 2019-10-08 PROCEDURE — 93000 ELECTROCARDIOGRAM COMPLETE: CPT

## 2019-10-08 PROCEDURE — 36415 COLL VENOUS BLD VENIPUNCTURE: CPT

## 2019-10-08 NOTE — ASSESSMENT
[FreeTextEntry1] : sinonasal symptoms and annia. neck swelling-scheduled to see ENT in 2 days.\par Diabetes-hemoglobin A1c has been high for past one year. Patient refuses second diabetes medication. Continue metformin 500 mg 2 tablets b.i.d.  Hemoglobin A1c.  Want A1C<7.\par Continue same thyroid medication.  TFT.\par

## 2019-10-08 NOTE — PHYSICAL EXAM
[Nasal Endoscopy Performed] : nasal endoscopy was performed, see procedure section for findings [Normal] : cranial nerves 2-12 intact [de-identified] : dry oral mucosa  [de-identified] : significant bilateral submandibular gland hypertrophy with tenderness to palpation with bilateral cervical LAD [de-identified] : TMs bilaterally are intact with serous effusion, no erythema [de-identified] : see nasal endoscopy [FreeTextEntry1] : Mild excoriation along uvula [de-identified] : +mouth breathing, no stridor [de-identified] : right inferior eyelid with focal erythema and edema with purulence drainage [de-identified] : bilateral firm submandibular gland hypertrophy and tenderness, no overlying erythema or induration

## 2019-10-08 NOTE — DATA REVIEWED
[de-identified] : 10/4 Na 129, glucose 161, WBC 21 [de-identified] : CT scan 10/4/2019 (report, no images available): bilateral maxillary, ethmoid, sphenoid and frontal sinus mucosal thickening. Septum is midline. Soft tissue prominence overlying the body of the right mandible

## 2019-10-08 NOTE — HISTORY OF PRESENT ILLNESS
[de-identified] : The patient is a 64 year old female who presents with 4mo of persistent sinonasal symptoms. Initially began in June with facial pain/pressure, anterior rhinorrhea, headache and fever. She was initially treated with antibiotics and steroids without resolution. Her symptoms have worsened over the last several months despite 3 different antibiotics (amoxicillin 8/2-8/12, augmentin 9/18-9/28, levofloxacin 10/3-current) and 2 courses of oral prednisone (most recently 5mg daily from 8/15-9/3). In addition, the patient has noted progressive bilateral neck swelling in the submandibular regions, as well as weight loss, fatigue, anorexia, and night sweats. Reports that she was recently unable to eat due to severe tongue pain. She saw an outside ENT (Dr. Douglas Mcdermott) recently who performed a L-sided sinonasal biopsy given concern for lymphoma however the pathology results are not back. Also had a culture performed at that time and was started on levofloxicin which she is currently taking. Underwent CT sinus demonstrating pansinus disease. Symptoms are significant enough at this time that patient is having a difficult time sleeping. Also recently noticed increased swelling along her R lower eye lid with purulent drainage. Denies visual changes.\par \par She recently had labs drawn at outside ED. CBC demonstrated WBC 21, platelet count 561, H/H 13.9/39.

## 2019-10-08 NOTE — PROCEDURE
[FreeTextEntry6] : Nasal Endoscopy (88310)\par \par After informed verbal consent, nasal endoscopy was performed due to anterior rhinoscopy insufficient to account for symptoms.  0 degree rigid scope was used.  Topical vasoconstrictor and anesthetic was used.  The exam showed a midline septum\par \par Findings: Diffuse mucosal erythema and edema with friable mucosa along septum and lateral nasal wall. Mild bleeding when contacted by scope. Was unable to advance scope due to degree of inflammation, purulence and bleeding.\par Chuck purulence filling the bilateral nasal cavities s/p removal with suctioning and sent for culture\par Middle meatus and SER poorly visualized secondary to mucosal edema\par Inferior turbinates without significant hypertrophy\par No chuck polyposis or masses visualized

## 2019-10-08 NOTE — REASON FOR VISIT
[Initial Consultation] : an initial consultation for [Family Member] : family member [Patient Declined  Services] : - None: Patient declined  services [FreeTextEntry2] : referred by Dr. Edward Camargo for severe nasal congestion and sore throat  [FreeTextEntry3] : Patient declined offer of translation service.  Patient preferred to use accompanying daughter for translation.\par \par

## 2019-10-08 NOTE — PHYSICAL EXAM
[No Acute Distress] : no acute distress [Well Nourished] : well nourished [Well Developed] : well developed [Well-Appearing] : well-appearing [Normal Outer Ear/Nose] : the outer ears and nose were normal in appearance [No Respiratory Distress] : no respiratory distress  [Clear to Auscultation] : lungs were clear to auscultation bilaterally [Normal Rate] : normal rate  [Regular Rhythm] : with a regular rhythm [Soft] : abdomen soft [No Edema] : there was no peripheral edema [Non Tender] : non-tender [No CVA Tenderness] : no CVA  tenderness [Normal Posterior Cervical Nodes] : no posterior cervical lymphadenopathy [No Spinal Tenderness] : no spinal tenderness [Cyanosis] : no cyanosis [Coordination Grossly Intact] : coordination grossly intact [Abnormal Temperature] : normal temperature [Normal Gait] : normal gait [Normal Affect] : the affect was normal [Speech Grossly Normal] : speech grossly normal [Normal Mood] : the mood was normal

## 2019-10-08 NOTE — REVIEW OF SYSTEMS
[Seasonal Allergies] : seasonal allergies [Ear Pain] : ear pain [Nasal Congestion] : nasal congestion [Sinus Pressure] : sinus pressure [Sinus Pain] : sinus pain [Throat Pain] : throat pain [As Noted in HPI] : as noted in HPI [Throat Dryness] : throat dryness [Throat Itching] : throat itching [Cough] : cough [Swollen Glands In The Neck] : swollen glands in the neck [Negative] : Endocrine [Chills] : chills [Feeling Poorly] : feeling poorly [Recent Weight Loss (___ Lbs)] : recent [unfilled] ~Ulb weight loss [Discharge From Eyes] : purulent discharge from the eyes [Feeling Tired] : feeling tired [FreeTextEntry3] : Swelling and erythema along R lower lid with purulent drainage

## 2019-10-08 NOTE — HISTORY OF PRESENT ILLNESS
[FreeTextEntry1] : CPE [de-identified] : Patient complains of persistent sinonasal symptoms with bilateral anterior neck swelling.  Followed by ENT

## 2019-10-10 ENCOUNTER — APPOINTMENT (OUTPATIENT)
Dept: OTOLARYNGOLOGY | Facility: CLINIC | Age: 64
End: 2019-10-10
Payer: MEDICAID

## 2019-10-10 VITALS
WEIGHT: 94 LBS | BODY MASS INDEX: 17.75 KG/M2 | DIASTOLIC BLOOD PRESSURE: 71 MMHG | SYSTOLIC BLOOD PRESSURE: 109 MMHG | HEIGHT: 61 IN | HEART RATE: 101 BPM

## 2019-10-10 LAB
ALBUMIN SERPL ELPH-MCNC: 4.1 G/DL
ALP BLD-CCNC: 88 U/L
ALT SERPL-CCNC: 13 U/L
ANION GAP SERPL CALC-SCNC: 12 MMOL/L
AST SERPL-CCNC: 14 U/L
BACTERIA NOSE AEROBE CULT: NORMAL
BASOPHILS # BLD AUTO: 0.16 K/UL
BASOPHILS NFR BLD AUTO: 0.8 %
BILIRUB SERPL-MCNC: 0.3 MG/DL
BUN SERPL-MCNC: 8 MG/DL
CALCIUM SERPL-MCNC: 9.6 MG/DL
CHLORIDE SERPL-SCNC: 90 MMOL/L
CHOLEST SERPL-MCNC: 114 MG/DL
CHOLEST/HDLC SERPL: 3.4 RATIO
CO2 SERPL-SCNC: 26 MMOL/L
CREAT SERPL-MCNC: 0.48 MG/DL
EOSINOPHIL # BLD AUTO: 0.82 K/UL
EOSINOPHIL NFR BLD AUTO: 3.8 %
ESTIMATED AVERAGE GLUCOSE: 174 MG/DL
GLUCOSE SERPL-MCNC: 137 MG/DL
HBA1C MFR BLD HPLC: 7.7 %
HCT VFR BLD CALC: 40.3 %
HDLC SERPL-MCNC: 34 MG/DL
HGB BLD-MCNC: 12.7 G/DL
IMM GRANULOCYTES NFR BLD AUTO: 0.3 %
LDLC SERPL CALC-MCNC: 64 MG/DL
LYMPHOCYTES # BLD AUTO: 3.21 K/UL
LYMPHOCYTES NFR BLD AUTO: 15.1 %
MAN DIFF?: NORMAL
MCHC RBC-ENTMCNC: 31.5 GM/DL
MCHC RBC-ENTMCNC: 31.5 PG
MCV RBC AUTO: 100 FL
MONOCYTES # BLD AUTO: 1.73 K/UL
MONOCYTES NFR BLD AUTO: 8.1 %
NEUTROPHILS # BLD AUTO: 15.31 K/UL
NEUTROPHILS NFR BLD AUTO: 71.9 %
PLATELET # BLD AUTO: 558 K/UL
POTASSIUM SERPL-SCNC: 4.6 MMOL/L
PROT SERPL-MCNC: 8.1 G/DL
RBC # BLD: 4.03 M/UL
RBC # FLD: 13 %
SAVE SPECIMEN: NORMAL
SODIUM SERPL-SCNC: 128 MMOL/L
T4 FREE SERPL-MCNC: 1.4 NG/DL
TRIGL SERPL-MCNC: 81 MG/DL
TSH SERPL-ACNC: 3.51 UIU/ML
WBC # FLD AUTO: 21.3 K/UL

## 2019-10-10 PROCEDURE — 99214 OFFICE O/P EST MOD 30 MIN: CPT

## 2019-10-11 NOTE — PHYSICAL EXAM
[Midline] : trachea located in midline position [Normal] : orientation to person, place, and time: normal [de-identified] : Presence of indurated enlargement of the bilateral submandibular glands and L parotid gland [de-identified] : Congested [de-identified] : Presence of erythema and edema of the R lower lid.

## 2019-10-11 NOTE — HISTORY OF PRESENT ILLNESS
[de-identified] : Patient referred by Dr Mcdermott for submandibular mass. Patient started her symptoms in the end of June 2019. Patient had frontal headaches, bilateral eye pressure and neck tenderness. Shortly after that she started having nasal obstruction. On 7/24/19 she had sinus CT. Was started on a few courses of Abx, last one was Levo, which she has been taking at this time.\par No fever, but occasional night sweats. Lost 8 lbs in the last month.\par \par

## 2019-10-11 NOTE — CONSULT LETTER
[Dear  ___] : Dear  [unfilled], [Consult Letter:] : I had the pleasure of evaluating your patient, [unfilled]. [Please see my note below.] : Please see my note below. [Consult Closing:] : Thank you very much for allowing me to participate in the care of this patient.  If you have any questions, please do not hesitate to contact me. [FreeTextEntry2] : Dr Douglas Mcdermott [Sincerely,] : Sincerely, [FreeTextEntry3] : \par Jordi Fuentes MD, FACS\par \par Otolaryngology-Head and Neck Surgery\par [employer] [facility]\par

## 2019-10-12 ENCOUNTER — MOBILE ON CALL (OUTPATIENT)
Age: 64
End: 2019-10-12

## 2019-10-13 LAB
CRP SERPL-MCNC: 3.6 MG/DL
ENA SS-A AB SER IA-ACNC: <0.2 AL
ENA SS-B AB SER IA-ACNC: 0.2 AL

## 2019-10-14 ENCOUNTER — FORM ENCOUNTER (OUTPATIENT)
Age: 64
End: 2019-10-14

## 2019-10-14 LAB — IGG4 SER-MCNC: 14.6 MG/DL

## 2019-10-15 ENCOUNTER — APPOINTMENT (OUTPATIENT)
Dept: OPHTHALMOLOGY | Facility: CLINIC | Age: 64
End: 2019-10-15

## 2019-10-15 ENCOUNTER — APPOINTMENT (OUTPATIENT)
Dept: ULTRASOUND IMAGING | Facility: IMAGING CENTER | Age: 64
End: 2019-10-15
Payer: MEDICAID

## 2019-10-15 ENCOUNTER — OUTPATIENT (OUTPATIENT)
Dept: OUTPATIENT SERVICES | Facility: HOSPITAL | Age: 64
LOS: 1 days | End: 2019-10-15
Payer: MEDICAID

## 2019-10-15 ENCOUNTER — RESULT REVIEW (OUTPATIENT)
Age: 64
End: 2019-10-15

## 2019-10-15 DIAGNOSIS — K11.20 SIALOADENITIS, UNSPECIFIED: ICD-10-CM

## 2019-10-15 DIAGNOSIS — R22.1 LOCALIZED SWELLING, MASS AND LUMP, NECK: ICD-10-CM

## 2019-10-15 PROCEDURE — 76942 ECHO GUIDE FOR BIOPSY: CPT

## 2019-10-15 PROCEDURE — 88185 FLOWCYTOMETRY/TC ADD-ON: CPT

## 2019-10-15 PROCEDURE — 20206 BIOPSY MUSCLE PERQ NEEDLE: CPT

## 2019-10-15 PROCEDURE — 76942 ECHO GUIDE FOR BIOPSY: CPT | Mod: 26

## 2019-10-15 PROCEDURE — 88173 CYTOPATH EVAL FNA REPORT: CPT | Mod: 26

## 2019-10-15 PROCEDURE — 87205 SMEAR GRAM STAIN: CPT

## 2019-10-15 PROCEDURE — 88305 TISSUE EXAM BY PATHOLOGIST: CPT

## 2019-10-15 PROCEDURE — 88172 CYTP DX EVAL FNA 1ST EA SITE: CPT

## 2019-10-15 PROCEDURE — 88188 FLOWCYTOMETRY/READ 9-15: CPT

## 2019-10-15 PROCEDURE — 88305 TISSUE EXAM BY PATHOLOGIST: CPT | Mod: 26

## 2019-10-15 PROCEDURE — 88173 CYTOPATH EVAL FNA REPORT: CPT

## 2019-10-15 PROCEDURE — 88184 FLOWCYTOMETRY/ TC 1 MARKER: CPT

## 2019-10-16 LAB — ANA SER IF-ACNC: NEGATIVE

## 2019-10-17 ENCOUNTER — EMERGENCY (EMERGENCY)
Facility: HOSPITAL | Age: 64
LOS: 1 days | Discharge: HOME | End: 2019-10-17
Attending: EMERGENCY MEDICINE
Payer: MEDICAID

## 2019-10-17 ENCOUNTER — APPOINTMENT (OUTPATIENT)
Dept: INTERNAL MEDICINE | Facility: CLINIC | Age: 64
End: 2019-10-17
Payer: MEDICAID

## 2019-10-17 ENCOUNTER — OUTPATIENT (OUTPATIENT)
Dept: OUTPATIENT SERVICES | Facility: HOSPITAL | Age: 64
LOS: 1 days | End: 2019-10-17
Payer: MEDICAID

## 2019-10-17 VITALS
DIASTOLIC BLOOD PRESSURE: 70 MMHG | HEART RATE: 99 BPM | WEIGHT: 95 LBS | TEMPERATURE: 97.2 F | HEIGHT: 61 IN | SYSTOLIC BLOOD PRESSURE: 110 MMHG | BODY MASS INDEX: 17.94 KG/M2 | OXYGEN SATURATION: 97 %

## 2019-10-17 VITALS
WEIGHT: 95.02 LBS | HEIGHT: 60 IN | DIASTOLIC BLOOD PRESSURE: 75 MMHG | SYSTOLIC BLOOD PRESSURE: 116 MMHG | OXYGEN SATURATION: 99 % | RESPIRATION RATE: 16 BRPM | HEART RATE: 99 BPM | TEMPERATURE: 98 F

## 2019-10-17 VITALS
TEMPERATURE: 98 F | OXYGEN SATURATION: 98 % | HEART RATE: 104 BPM | RESPIRATION RATE: 16 BRPM | WEIGHT: 95.02 LBS | SYSTOLIC BLOOD PRESSURE: 107 MMHG | HEIGHT: 60 IN | DIASTOLIC BLOOD PRESSURE: 71 MMHG

## 2019-10-17 DIAGNOSIS — K11.20 SIALOADENITIS, UNSPECIFIED: ICD-10-CM

## 2019-10-17 DIAGNOSIS — E03.9 HYPOTHYROIDISM, UNSPECIFIED: ICD-10-CM

## 2019-10-17 DIAGNOSIS — Z01.818 ENCOUNTER FOR OTHER PREPROCEDURAL EXAMINATION: ICD-10-CM

## 2019-10-17 DIAGNOSIS — D72.829 ELEVATED WHITE BLOOD CELL COUNT, UNSPECIFIED: ICD-10-CM

## 2019-10-17 DIAGNOSIS — Z98.890 OTHER SPECIFIED POSTPROCEDURAL STATES: Chronic | ICD-10-CM

## 2019-10-17 LAB
ALBUMIN SERPL ELPH-MCNC: 3 G/DL — LOW (ref 3.5–5)
ALP SERPL-CCNC: 71 U/L — SIGNIFICANT CHANGE UP (ref 40–120)
ALT FLD-CCNC: 14 U/L DA — SIGNIFICANT CHANGE UP (ref 10–60)
ANION GAP SERPL CALC-SCNC: 6 MMOL/L — SIGNIFICANT CHANGE UP (ref 5–17)
APPEARANCE UR: CLEAR — SIGNIFICANT CHANGE UP
AST SERPL-CCNC: 17 U/L — SIGNIFICANT CHANGE UP (ref 10–40)
BILIRUB SERPL-MCNC: 0.2 MG/DL — SIGNIFICANT CHANGE UP (ref 0.2–1.2)
BILIRUB UR-MCNC: NEGATIVE — SIGNIFICANT CHANGE UP
BUN SERPL-MCNC: 13 MG/DL — SIGNIFICANT CHANGE UP (ref 7–18)
CALCIUM SERPL-MCNC: 8.9 MG/DL — SIGNIFICANT CHANGE UP (ref 8.4–10.5)
CHLORIDE SERPL-SCNC: 102 MMOL/L — SIGNIFICANT CHANGE UP (ref 96–108)
CO2 SERPL-SCNC: 27 MMOL/L — SIGNIFICANT CHANGE UP (ref 22–31)
COLOR SPEC: YELLOW — SIGNIFICANT CHANGE UP
CREAT SERPL-MCNC: 0.52 MG/DL — SIGNIFICANT CHANGE UP (ref 0.5–1.3)
DIFF PNL FLD: NEGATIVE — SIGNIFICANT CHANGE UP
GLUCOSE SERPL-MCNC: 153 MG/DL — HIGH (ref 70–99)
GLUCOSE UR QL: NEGATIVE — SIGNIFICANT CHANGE UP
HCT VFR BLD CALC: 34.9 % — SIGNIFICANT CHANGE UP (ref 34.5–45)
HGB BLD-MCNC: 11.3 G/DL — LOW (ref 11.5–15.5)
KETONES UR-MCNC: ABNORMAL
LEUKOCYTE ESTERASE UR-ACNC: NEGATIVE — SIGNIFICANT CHANGE UP
MCHC RBC-ENTMCNC: 31.6 PG — SIGNIFICANT CHANGE UP (ref 27–34)
MCHC RBC-ENTMCNC: 32.4 GM/DL — SIGNIFICANT CHANGE UP (ref 32–36)
MCV RBC AUTO: 97.5 FL — SIGNIFICANT CHANGE UP (ref 80–100)
NITRITE UR-MCNC: NEGATIVE — SIGNIFICANT CHANGE UP
NRBC # BLD: 0 /100 WBCS — SIGNIFICANT CHANGE UP (ref 0–0)
PH UR: 6 — SIGNIFICANT CHANGE UP (ref 5–8)
PLATELET # BLD AUTO: 521 K/UL — HIGH (ref 150–400)
POTASSIUM SERPL-MCNC: 4.2 MMOL/L — SIGNIFICANT CHANGE UP (ref 3.5–5.3)
POTASSIUM SERPL-SCNC: 4.2 MMOL/L — SIGNIFICANT CHANGE UP (ref 3.5–5.3)
PROT SERPL-MCNC: 7.5 G/DL — SIGNIFICANT CHANGE UP (ref 6–8.3)
PROT UR-MCNC: NEGATIVE — SIGNIFICANT CHANGE UP
RBC # BLD: 3.58 M/UL — LOW (ref 3.8–5.2)
RBC # FLD: 12.8 % — SIGNIFICANT CHANGE UP (ref 10.3–14.5)
SODIUM SERPL-SCNC: 135 MMOL/L — SIGNIFICANT CHANGE UP (ref 135–145)
SP GR SPEC: 1 — LOW (ref 1.01–1.02)
UROBILINOGEN FLD QL: NEGATIVE — SIGNIFICANT CHANGE UP
WBC # BLD: 25.19 K/UL — HIGH (ref 3.8–10.5)
WBC # FLD AUTO: 25.19 K/UL — HIGH (ref 3.8–10.5)

## 2019-10-17 PROCEDURE — 93005 ELECTROCARDIOGRAM TRACING: CPT

## 2019-10-17 PROCEDURE — 36415 COLL VENOUS BLD VENIPUNCTURE: CPT

## 2019-10-17 PROCEDURE — 99214 OFFICE O/P EST MOD 30 MIN: CPT | Mod: 25

## 2019-10-17 PROCEDURE — 85027 COMPLETE CBC AUTOMATED: CPT

## 2019-10-17 PROCEDURE — 87086 URINE CULTURE/COLONY COUNT: CPT

## 2019-10-17 PROCEDURE — 99283 EMERGENCY DEPT VISIT LOW MDM: CPT | Mod: 25

## 2019-10-17 PROCEDURE — 99283 EMERGENCY DEPT VISIT LOW MDM: CPT

## 2019-10-17 PROCEDURE — 81003 URINALYSIS AUTO W/O SCOPE: CPT

## 2019-10-17 PROCEDURE — 80053 COMPREHEN METABOLIC PANEL: CPT

## 2019-10-17 PROCEDURE — 71046 X-RAY EXAM CHEST 2 VIEWS: CPT

## 2019-10-17 PROCEDURE — 71046 X-RAY EXAM CHEST 2 VIEWS: CPT | Mod: 26

## 2019-10-17 RX ORDER — METFORMIN HYDROCHLORIDE 850 MG/1
1 TABLET ORAL
Qty: 0 | Refills: 0 | DISCHARGE

## 2019-10-17 RX ORDER — SODIUM CHLORIDE 9 MG/ML
3 INJECTION INTRAMUSCULAR; INTRAVENOUS; SUBCUTANEOUS EVERY 8 HOURS
Refills: 0 | Status: DISCONTINUED | OUTPATIENT
Start: 2019-10-21 | End: 2019-10-21

## 2019-10-17 RX ORDER — LEVOTHYROXINE SODIUM 125 MCG
1 TABLET ORAL
Qty: 0 | Refills: 0 | DISCHARGE

## 2019-10-17 NOTE — H&P PST ADULT - NSICDXPASTMEDICALHX_GEN_ALL_CORE_FT
PAST MEDICAL HISTORY:  Anemia     History of weight loss     Hypothyroidism     Leukocytosis     Nasal congestion     Neck swelling right submandibular mass    Sialoadenitis     Sinus congestion     Type 2 diabetes mellitus PAST MEDICAL HISTORY:  Anemia     Cough x 3 weeks , no productive    History of weight loss     Hypothyroidism     Leukocytosis 10/4/- 21k, today 25 k- pt broought to ER, Dr. Fuentes Surgeon notified , Kesha Ashley NP  notified    Nasal congestion     Neck swelling right submandibular mass    Sialoadenitis     Sinus congestion     Type 2 diabetes mellitus

## 2019-10-17 NOTE — ED PROVIDER NOTE - CLINICAL SUMMARY MEDICAL DECISION MAKING FREE TEXT BOX
patient with persistent leukocystosis. she looks well, vitals WNL. CXR negative and UA negative. discussed with ENT Dr Fuentes, he agrees that patient can be discharged home and f/u as an outpatient. strict precautions when to return to the ED given and understood.

## 2019-10-17 NOTE — ED ADULT NURSE NOTE - NSIMPLEMENTINTERV_GEN_ALL_ED
Implemented All Universal Safety Interventions:  [place] to call system. Call bell, personal items and telephone within reach. Instruct patient to call for assistance. Room bathroom lighting operational. Non-slip footwear when patient is off stretcher. Physically safe environment: no spills, clutter or unnecessary equipment. Stretcher in lowest position, wheels locked, appropriate side rails in place.

## 2019-10-17 NOTE — H&P PST ADULT - GASTROINTESTINAL DETAILS
no rebound tenderness/normal/bowel sounds normal/no bruit/soft/nontender/no distention/no masses palpable

## 2019-10-17 NOTE — H&P PST ADULT - NSICDXPROBLEM_GEN_ALL_CORE_FT
PROBLEM DIAGNOSES  Problem: Leukocytosis  Assessment and Plan: Pt had WBC 21 K on 10/4/19, at ER, got Levaquin PO, finished abx , todays WBC 25.19, unable to reach NP Mikayla Diaz NP . pt 's PCP , unable to reach Surgeon , DR. Fuentes, , called Kesha Ashley NP ,  , pt brought to ER for evaluation, left message for  covering medical team in [facility] for NP John , spoke with covering NP to rely message to SHERRY Deg[facility], spoke to DR. Fuentes, as per him pt eastman not need to have medical clearance , advised to discuss with Anesthesia , Dr. Trivedi , pt went to ER in stable condition without distress     Problem: Sialoadenitis  Assessment and Plan: Patient  is scheduled for left side incisional biopsy of salivary gland and excision of left submandibular gland on 10/21/19.     Problem: Hypothyroidism  Assessment and Plan: Pt advised to take thyroid medication in am of sx with few sips of water , pt verbalized understanding

## 2019-10-17 NOTE — ED PROVIDER NOTE - PMH
Hpi Title: Evaluation of Skin Lesions
Anemia    History of weight loss    Hypothyroidism    Leukocytosis    Nasal congestion    Neck swelling  right submandibular mass  Sialoadenitis    Sinus congestion    Type 2 diabetes mellitus

## 2019-10-17 NOTE — H&P PST ADULT - NEGATIVE CARDIOVASCULAR SYMPTOMS
no paroxysmal nocturnal dyspnea/no claudication/no orthopnea/no palpitations/no chest pain/no peripheral edema/no dyspnea on exertion

## 2019-10-17 NOTE — ED PROVIDER NOTE - PATIENT PORTAL LINK FT
You can access the FollowMyHealth Patient Portal offered by [facility] by registering at the following website: [email or web address]. By joining [employer]’s FollowMyHealth portal, you will also be able to view your health information using other applications (apps) compatible with our system.

## 2019-10-17 NOTE — H&P PST ADULT - PRIMARY CARE PROVIDER
Edward Camargo MD [facility] [phone] Edward Camargo MD pcp [phone], NP Mikayla Diaz, [phone]

## 2019-10-17 NOTE — H&P PST ADULT - NECK DETAILS
left and right submandibular masses 3cm x 3cm , tender to palpation, , hx of right mass bx , bandaid in place, neck swelling/normal thyroid gland

## 2019-10-17 NOTE — H&P PST ADULT - HISTORY OF PRESENT ILLNESS
64 years old female presented to [facility] for evaluation before surgery, pt was diagnosed with sialoadenitis and is scheduled for left side incisional biopsy of salivary gland and excision of left submandibular gland on 10/21/19.

## 2019-10-17 NOTE — H&P PST ADULT - NEGATIVE NEUROLOGICAL SYMPTOMS
no focal seizures/no weakness/no generalized seizures/no confusion/no transient paralysis/no tremors/no headache/no vertigo/no hemiparesis/no facial palsy/no loss of sensation/no difficulty walking/no loss of consciousness/no syncope/no paresthesias

## 2019-10-17 NOTE — ED PROVIDER NOTE - OBJECTIVE STATEMENT
65 yo female presents for evaluation of leukocytosis. patient is currently being evaluated by an ENT specialist for sinusitis and submandibular gland swelling. she is s/p biopsy 3 days ago. patient was referred to ED from presurgical testing for elevated WBC. patient had evaluation and [facility] last week and was found to have a WBC of 21K. Today she was found to have a WBC count of 25K. she was referred for evaluation of leukocytosis. she denies fever or chills. no abdominal pain, no vomiting or diarrhea. no dysuria. nonproductive cough present for the last 2 weeks.

## 2019-10-17 NOTE — H&P PST ADULT - NEGATIVE OPHTHALMOLOGIC SYMPTOMS
no blurred vision R/no irritation L/no photophobia/no discharge L/no lacrimation L/no pain R/no irritation R/no discharge R/no pain L/no lacrimation R/no blurred vision L/no diplopia

## 2019-10-17 NOTE — ED PROVIDER NOTE - ENMT, MLM
Airway patent, Nasal mucosa clear. Mouth with normal mucosa. Throat has no vesicles, no oropharyngeal exudates and uvula is midline. right sided biopsy site without erythema or fluctuance.

## 2019-10-17 NOTE — H&P PST ADULT - NEGATIVE GENERAL GENITOURINARY SYMPTOMS
normal urinary frequency/no nocturia/no flank pain R/no renal colic/no hematuria/no incontinence/no dysuria/no urinary hesitancy/no flank pain L

## 2019-10-17 NOTE — H&P PST ADULT - NEGATIVE GASTROINTESTINAL SYMPTOMS
no vomiting/no abdominal pain/no diarrhea/no nausea/no change in bowel habits/no flatulence/no constipation

## 2019-10-18 LAB
ALBUMIN SERPL ELPH-MCNC: 3.8 G/DL
ALP BLD-CCNC: 68 U/L
ALT SERPL-CCNC: 10 U/L
ANION GAP SERPL CALC-SCNC: 16 MMOL/L
AST SERPL-CCNC: 17 U/L
BASOPHILS # BLD AUTO: 0.11 K/UL
BASOPHILS NFR BLD AUTO: 0.6 %
BILIRUB SERPL-MCNC: <0.2 MG/DL
BUN SERPL-MCNC: 11 MG/DL
CALCIUM SERPL-MCNC: 9.5 MG/DL
CHLORIDE SERPL-SCNC: 99 MMOL/L
CO2 SERPL-SCNC: 24 MMOL/L
CREAT SERPL-MCNC: 0.45 MG/DL
EOSINOPHIL # BLD AUTO: 1.71 K/UL
EOSINOPHIL NFR BLD AUTO: 9.1 %
GLUCOSE SERPL-MCNC: 100 MG/DL
HCT VFR BLD CALC: 38 %
HGB BLD-MCNC: 11.8 G/DL
IMM GRANULOCYTES NFR BLD AUTO: 0.4 %
LYMPHOCYTES # BLD AUTO: 2.96 K/UL
LYMPHOCYTES NFR BLD AUTO: 15.8 %
MAN DIFF?: NORMAL
MCHC RBC-ENTMCNC: 31.1 GM/DL
MCHC RBC-ENTMCNC: 31.4 PG
MCV RBC AUTO: 101.1 FL
MONOCYTES # BLD AUTO: 1.12 K/UL
MONOCYTES NFR BLD AUTO: 6 %
NEUTROPHILS # BLD AUTO: 12.82 K/UL
NEUTROPHILS NFR BLD AUTO: 68.1 %
PLATELET # BLD AUTO: 592 K/UL
POTASSIUM SERPL-SCNC: 5.1 MMOL/L
PROT SERPL-MCNC: 7.6 G/DL
RBC # BLD: 3.76 M/UL
RBC # FLD: 13.2 %
SAVE SPECIMEN: NORMAL
SODIUM SERPL-SCNC: 139 MMOL/L
WBC # FLD AUTO: 18.79 K/UL

## 2019-10-19 LAB
CULTURE RESULTS: SIGNIFICANT CHANGE UP
SPECIMEN SOURCE: SIGNIFICANT CHANGE UP

## 2019-10-20 ENCOUNTER — TRANSCRIPTION ENCOUNTER (OUTPATIENT)
Age: 64
End: 2019-10-20

## 2019-10-21 ENCOUNTER — RESULT REVIEW (OUTPATIENT)
Age: 64
End: 2019-10-21

## 2019-10-21 ENCOUNTER — APPOINTMENT (OUTPATIENT)
Dept: OTOLARYNGOLOGY | Facility: HOSPITAL | Age: 64
End: 2019-10-21

## 2019-10-21 ENCOUNTER — INPATIENT (INPATIENT)
Facility: HOSPITAL | Age: 64
LOS: 0 days | Discharge: HOME | DRG: 155 | End: 2019-10-22
Attending: OTOLARYNGOLOGY | Admitting: OTOLARYNGOLOGY
Payer: MEDICAID

## 2019-10-21 VITALS
WEIGHT: 95.02 LBS | DIASTOLIC BLOOD PRESSURE: 71 MMHG | OXYGEN SATURATION: 98 % | HEIGHT: 60 IN | HEART RATE: 110 BPM | SYSTOLIC BLOOD PRESSURE: 110 MMHG | TEMPERATURE: 98 F | RESPIRATION RATE: 16 BRPM

## 2019-10-21 DIAGNOSIS — Z01.818 ENCOUNTER FOR OTHER PREPROCEDURAL EXAMINATION: ICD-10-CM

## 2019-10-21 DIAGNOSIS — Z98.890 OTHER SPECIFIED POSTPROCEDURAL STATES: Chronic | ICD-10-CM

## 2019-10-21 DIAGNOSIS — K11.20 SIALOADENITIS, UNSPECIFIED: ICD-10-CM

## 2019-10-21 PROCEDURE — 42405 BIOPSY OF SALIVARY GLAND: CPT

## 2019-10-21 PROCEDURE — 88305 TISSUE EXAM BY PATHOLOGIST: CPT | Mod: 26

## 2019-10-21 PROCEDURE — 71045 X-RAY EXAM CHEST 1 VIEW: CPT | Mod: 26

## 2019-10-21 RX ORDER — METFORMIN HYDROCHLORIDE 850 MG/1
1000 TABLET ORAL
Refills: 0 | Status: DISCONTINUED | OUTPATIENT
Start: 2019-10-21 | End: 2019-10-21

## 2019-10-21 RX ORDER — SODIUM CHLORIDE 9 MG/ML
1000 INJECTION, SOLUTION INTRAVENOUS
Refills: 0 | Status: DISCONTINUED | OUTPATIENT
Start: 2019-10-21 | End: 2019-10-22

## 2019-10-21 RX ORDER — OXYCODONE AND ACETAMINOPHEN 5; 325 MG/1; MG/1
1 TABLET ORAL EVERY 4 HOURS
Refills: 0 | Status: DISCONTINUED | OUTPATIENT
Start: 2019-10-21 | End: 2019-10-22

## 2019-10-21 RX ORDER — INFLUENZA VIRUS VACCINE 15; 15; 15; 15 UG/.5ML; UG/.5ML; UG/.5ML; UG/.5ML
0.5 SUSPENSION INTRAMUSCULAR ONCE
Refills: 0 | Status: DISCONTINUED | OUTPATIENT
Start: 2019-10-21 | End: 2019-10-22

## 2019-10-21 RX ORDER — LEVOTHYROXINE SODIUM 125 MCG
50 TABLET ORAL DAILY
Refills: 0 | Status: DISCONTINUED | OUTPATIENT
Start: 2019-10-21 | End: 2019-10-22

## 2019-10-21 RX ORDER — TRAMADOL HYDROCHLORIDE 50 MG/1
1 TABLET ORAL
Qty: 12 | Refills: 0
Start: 2019-10-21 | End: 2019-10-23

## 2019-10-21 RX ORDER — METFORMIN HYDROCHLORIDE 850 MG/1
1000 TABLET ORAL
Refills: 0 | Status: DISCONTINUED | OUTPATIENT
Start: 2019-10-21 | End: 2019-10-22

## 2019-10-21 RX ORDER — HYDROMORPHONE HYDROCHLORIDE 2 MG/ML
0.5 INJECTION INTRAMUSCULAR; INTRAVENOUS; SUBCUTANEOUS
Refills: 0 | Status: DISCONTINUED | OUTPATIENT
Start: 2019-10-21 | End: 2019-10-21

## 2019-10-21 RX ORDER — SODIUM CHLORIDE 9 MG/ML
1000 INJECTION, SOLUTION INTRAVENOUS
Refills: 0 | Status: DISCONTINUED | OUTPATIENT
Start: 2019-10-21 | End: 2019-10-21

## 2019-10-21 RX ORDER — LEVOTHYROXINE SODIUM 125 MCG
25 TABLET ORAL DAILY
Refills: 0 | Status: DISCONTINUED | OUTPATIENT
Start: 2019-10-21 | End: 2019-10-22

## 2019-10-21 RX ADMIN — SODIUM CHLORIDE 3 MILLILITER(S): 9 INJECTION INTRAMUSCULAR; INTRAVENOUS; SUBCUTANEOUS at 11:59

## 2019-10-21 NOTE — BRIEF OPERATIVE NOTE - OPERATION/FINDINGS
Open incisional biopsy of right submandibular gland.  Firm right submandibular gland w/ associated inflammation.

## 2019-10-21 NOTE — ASU DISCHARGE PLAN (ADULT/PEDIATRIC) - CARE PROVIDER_API CALL
Jordi Fuentes)  Otolaryngology  [address] [place] [address]  Phone: [phone]  Fax: [phone]  Follow Up Time:

## 2019-10-21 NOTE — ASU DISCHARGE PLAN (ADULT/PEDIATRIC) - ASU DC SPECIAL INSTRUCTIONSFT
Remove the outer dressing in 24 hours. Leave the underlying steri strips in place, they will fall off by themselves. Bathe normally and keep the incision area clean and dry. Do not scrub.   Take over-the-counter ibuprofen for pain. You have also been prescribed percocet for any additional pain, take as directed.

## 2019-10-21 NOTE — CHART NOTE - NSCHARTNOTEFT_GEN_A_CORE
Pt POD 0 s/p open bx R submandibular gland  as per anesthesia sats in PACU were in low 90s  sbp 90s  no SOB or CP per pt  c/o some cough since pre-op, non productive    Vital Signs Last 24 Hrs  T(C): 36.4 (21 Oct 2019 19:38), Max: 37.1 (21 Oct 2019 17:37)  T(F): 97.5 (21 Oct 2019 19:38), Max: 98.8 (21 Oct 2019 17:37)  HR: 108 (21 Oct 2019 21:06) (105 - 117)  BP: 92/49 (21 Oct 2019 21:06) (87/42 - 133/61)  BP(mean): 60 (21 Oct 2019 21:06) (53 - 79)  RR: 17 (21 Oct 2019 21:06) (16 - 23)  SpO2: 92% (21 Oct 2019 21:06) (92% - 99%)    Pt NAD at this time  awake, alert  S1S2  R neck dressing CDI, neck soft, no hematoma    case discussed with Dr Kaur  will admit for overnight observation

## 2019-10-22 ENCOUNTER — APPOINTMENT (OUTPATIENT)
Dept: OTOLARYNGOLOGY | Facility: CLINIC | Age: 64
End: 2019-10-22

## 2019-10-22 ENCOUNTER — TRANSCRIPTION ENCOUNTER (OUTPATIENT)
Age: 64
End: 2019-10-22

## 2019-10-22 VITALS
OXYGEN SATURATION: 98 % | RESPIRATION RATE: 16 BRPM | HEART RATE: 82 BPM | SYSTOLIC BLOOD PRESSURE: 102 MMHG | DIASTOLIC BLOOD PRESSURE: 53 MMHG | TEMPERATURE: 98 F

## 2019-10-22 DIAGNOSIS — D72.829 ELEVATED WHITE BLOOD CELL COUNT, UNSPECIFIED: ICD-10-CM

## 2019-10-22 DIAGNOSIS — R09.02 HYPOXEMIA: ICD-10-CM

## 2019-10-22 DIAGNOSIS — E11.9 TYPE 2 DIABETES MELLITUS WITHOUT COMPLICATIONS: ICD-10-CM

## 2019-10-22 DIAGNOSIS — E03.9 HYPOTHYROIDISM, UNSPECIFIED: ICD-10-CM

## 2019-10-22 DIAGNOSIS — Z29.9 ENCOUNTER FOR PROPHYLACTIC MEASURES, UNSPECIFIED: ICD-10-CM

## 2019-10-22 DIAGNOSIS — D64.9 ANEMIA, UNSPECIFIED: ICD-10-CM

## 2019-10-22 PROBLEM — R09.81 NASAL CONGESTION: Chronic | Status: ACTIVE | Noted: 2019-10-17

## 2019-10-22 PROBLEM — R22.1 LOCALIZED SWELLING, MASS AND LUMP, NECK: Chronic | Status: ACTIVE | Noted: 2019-10-17

## 2019-10-22 PROBLEM — K11.20 SIALOADENITIS, UNSPECIFIED: Chronic | Status: ACTIVE | Noted: 2019-10-17

## 2019-10-22 PROBLEM — Z87.898 PERSONAL HISTORY OF OTHER SPECIFIED CONDITIONS: Chronic | Status: ACTIVE | Noted: 2019-10-17

## 2019-10-22 LAB
ALBUMIN SERPL ELPH-MCNC: 2.6 G/DL — LOW (ref 3.5–5)
ALP SERPL-CCNC: 65 U/L — SIGNIFICANT CHANGE UP (ref 40–120)
ALT FLD-CCNC: 13 U/L DA — SIGNIFICANT CHANGE UP (ref 10–60)
ANION GAP SERPL CALC-SCNC: 5 MMOL/L — SIGNIFICANT CHANGE UP (ref 5–17)
APTT BLD: 31 SEC — SIGNIFICANT CHANGE UP (ref 27.5–36.3)
AST SERPL-CCNC: 9 U/L — LOW (ref 10–40)
BASOPHILS # BLD AUTO: 0.07 K/UL — SIGNIFICANT CHANGE UP (ref 0–0.2)
BASOPHILS NFR BLD AUTO: 0.3 % — SIGNIFICANT CHANGE UP (ref 0–2)
BILIRUB SERPL-MCNC: 0.3 MG/DL — SIGNIFICANT CHANGE UP (ref 0.2–1.2)
BUN SERPL-MCNC: 11 MG/DL — SIGNIFICANT CHANGE UP (ref 7–18)
CALCIUM SERPL-MCNC: 8.3 MG/DL — LOW (ref 8.4–10.5)
CHLORIDE SERPL-SCNC: 101 MMOL/L — SIGNIFICANT CHANGE UP (ref 96–108)
CO2 SERPL-SCNC: 29 MMOL/L — SIGNIFICANT CHANGE UP (ref 22–31)
CREAT SERPL-MCNC: 0.58 MG/DL — SIGNIFICANT CHANGE UP (ref 0.5–1.3)
EOSINOPHIL # BLD AUTO: 0.04 K/UL — SIGNIFICANT CHANGE UP (ref 0–0.5)
EOSINOPHIL NFR BLD AUTO: 0.2 % — SIGNIFICANT CHANGE UP (ref 0–6)
GLUCOSE SERPL-MCNC: 250 MG/DL — HIGH (ref 70–99)
HCT VFR BLD CALC: 33.1 % — LOW (ref 34.5–45)
HGB BLD-MCNC: 10.8 G/DL — LOW (ref 11.5–15.5)
IMM GRANULOCYTES NFR BLD AUTO: 1.1 % — SIGNIFICANT CHANGE UP (ref 0–1.5)
INR BLD: 1.21 RATIO — HIGH (ref 0.88–1.16)
LYMPHOCYTES # BLD AUTO: 1.27 K/UL — SIGNIFICANT CHANGE UP (ref 1–3.3)
LYMPHOCYTES # BLD AUTO: 6 % — LOW (ref 13–44)
MAGNESIUM SERPL-MCNC: 1.9 MG/DL — SIGNIFICANT CHANGE UP (ref 1.6–2.6)
MCHC RBC-ENTMCNC: 31.3 PG — SIGNIFICANT CHANGE UP (ref 27–34)
MCHC RBC-ENTMCNC: 32.6 GM/DL — SIGNIFICANT CHANGE UP (ref 32–36)
MCV RBC AUTO: 95.9 FL — SIGNIFICANT CHANGE UP (ref 80–100)
MONOCYTES # BLD AUTO: 0.14 K/UL — SIGNIFICANT CHANGE UP (ref 0–0.9)
MONOCYTES NFR BLD AUTO: 0.7 % — LOW (ref 2–14)
NEUTROPHILS # BLD AUTO: 19.52 K/UL — HIGH (ref 1.8–7.4)
NEUTROPHILS NFR BLD AUTO: 91.7 % — HIGH (ref 43–77)
NRBC # BLD: 0 /100 WBCS — SIGNIFICANT CHANGE UP (ref 0–0)
PHOSPHATE SERPL-MCNC: 3.7 MG/DL — SIGNIFICANT CHANGE UP (ref 2.5–4.5)
PLATELET # BLD AUTO: 587 K/UL — HIGH (ref 150–400)
POTASSIUM SERPL-MCNC: 4.3 MMOL/L — SIGNIFICANT CHANGE UP (ref 3.5–5.3)
POTASSIUM SERPL-SCNC: 4.3 MMOL/L — SIGNIFICANT CHANGE UP (ref 3.5–5.3)
PROT SERPL-MCNC: 6.7 G/DL — SIGNIFICANT CHANGE UP (ref 6–8.3)
PROTHROM AB SERPL-ACNC: 13.5 SEC — HIGH (ref 10–12.9)
RBC # BLD: 3.45 M/UL — LOW (ref 3.8–5.2)
RBC # FLD: 13.1 % — SIGNIFICANT CHANGE UP (ref 10.3–14.5)
SODIUM SERPL-SCNC: 135 MMOL/L — SIGNIFICANT CHANGE UP (ref 135–145)
WBC # BLD: 21.28 K/UL — HIGH (ref 3.8–10.5)
WBC # FLD AUTO: 21.28 K/UL — HIGH (ref 3.8–10.5)

## 2019-10-22 PROCEDURE — 88189 FLOWCYTOMETRY/READ 16 & >: CPT

## 2019-10-22 RX ORDER — METFORMIN HYDROCHLORIDE 850 MG/1
1 TABLET ORAL
Qty: 0 | Refills: 0 | DISCHARGE
Start: 2019-10-22

## 2019-10-22 RX ORDER — SODIUM CHLORIDE 9 MG/ML
500 INJECTION INTRAMUSCULAR; INTRAVENOUS; SUBCUTANEOUS ONCE
Refills: 0 | Status: COMPLETED | OUTPATIENT
Start: 2019-10-22 | End: 2019-10-22

## 2019-10-22 RX ORDER — METFORMIN HYDROCHLORIDE 850 MG/1
2 TABLET ORAL
Qty: 0 | Refills: 0 | DISCHARGE

## 2019-10-22 RX ADMIN — SODIUM CHLORIDE 250 MILLILITER(S): 9 INJECTION INTRAMUSCULAR; INTRAVENOUS; SUBCUTANEOUS at 00:45

## 2019-10-22 RX ADMIN — METFORMIN HYDROCHLORIDE 1000 MILLIGRAM(S): 850 TABLET ORAL at 05:46

## 2019-10-22 RX ADMIN — Medication 100 MILLIGRAM(S): at 02:06

## 2019-10-22 NOTE — PROGRESS NOTE ADULT - SUBJECTIVE AND OBJECTIVE BOX
Pt seen and assessed, POD #1 s/p open incisional bx of right submandibular gland. Observed overnight for mild hypoxia to SaO2 90% while in PACU. Saturation has since improved, this AM 98% on room air. Otherwise no acute events and pt w/ no complaints. Pain well-controlled. Feels well.       T(F): 97.9 (22 Oct 2019 06:14), Max: 98.8 (21 Oct 2019 17:37)  HR: 80 (22 Oct 2019 06:14) (80 - 117)  BP: 94/52 (22 Oct 2019 06:14) (87/42 - 133/61)  RR: 16 (22 Oct 2019 06:14) (16 - 23)  SpO2: 97% (22 Oct 2019 06:14) (92% - 99%)    NAD  Neck soft, R submandibular incision clean. No evidence of hematoma. Trachea midline. No stridor.   Normal respiratory effort

## 2019-10-22 NOTE — DISCHARGE NOTE PROVIDER - HOSPITAL COURSE
64F admitted after open incisional biopsy of right submandibular gland, observed due to hypoxia to 90% in PACU which soon resolved. Patient recovered well after surgery with minimal swelling, minimal pain, no difficulties swallowing or phonating.

## 2019-10-22 NOTE — CONSULT NOTE ADULT - SUBJECTIVE AND OBJECTIVE BOX
63 y/o F PMH Hypothyroidism (alternating 25 and 50 mcg), T2DM (Metformin 1000 BID), and Recurrent Sinusitis & Sialadenitis presented for local excisional biopsy of submandibular gland w/ ENT - uncomplicated operative course w/ EBL 30 mL - post operative noted hypoxia to 90 requiring 2 L NC and medicine was consulted for further recommendations. Patient seen and examined at bedside w/ daughter present in NAD - states chronic symptoms and prior to OR today patient had been having nonproductive cough x 3 days but no fever. In general patient endorses night sweats and weight loss 10 lbs over month 2/2 poor PO intake and sleep. Patient is following ENT closely outpatient given severe persistent symptoms and possible concerns for lymphoma and leukemia - had trial of Prednisone 5 mg w/ improvement of her symptoms (hence considerations for Ig4 disease) but will defer steroid treatment until current incisional Bx results are known.     INTERVAL HPI/OVERNIGHT EVENTS:  T(C): 36.9 (10-21-19 @ 23:19), Max: 37.1 (10-21-19 @ 17:37)  HR: 99 (10-21-19 @ 23:19) (99 - 117)  BP: 93/51 (10-21-19 @ 23:19) (87/42 - 133/61)  RR: 18 (10-21-19 @ 23:19) (16 - 23)  SpO2: 98% (10-21-19 @ 23:19) (92% - 99%)    21 Oct 2019 07:01  -  22 Oct 2019 00:27  --------------------------------------------------------  IN: 900 mL / OUT: 0 mL / NET: 900 mL    PAST MEDICAL & SURGICAL HISTORY:  Neck swelling: right submandibular mass  Leukocytosis: 10/4  Anemia  Sialoadenitis  Type 2 diabetes mellitus  Nasal congestion  Sinus congestion  Hypothyroidism  S/P biopsy: right Parotid gland - 10/15/19    SOCIAL HISTORY  Alcohol: denied  Tobacco: denied  Illicit substance use: denied    FAMILY HISTORY: + DM; no FHx of VTE      LABS:                        10.8   21.28 )-----------( 587      ( 21 Oct 2019 23:59 )             33.1     10-21    135  |  101  |  11  ----------------------------<  250<H>  4.3   |  29  |  x     Ca    8.3<L>      21 Oct 2019 23:59  Mg     1.9     10-21    TPro  x   /  Alb  2.6<L>  /  TBili  x   /  DBili  x   /  AST  x   /  ALT  x   /  AlkPhos  x   10-21    PT/INR - ( 21 Oct 2019 23:59 )   PT: 13.5 sec;   INR: 1.21 ratio         PTT - ( 21 Oct 2019 23:59 )  PTT:31.0 sec    CAPILLARY BLOOD GLUCOSE    POCT Blood Glucose.: 168 mg/dL (21 Oct 2019 11:27)    MEDICATIONS  (STANDING):  guaiFENesin    Syrup 100 milliGRAM(s) Oral once  influenza   Vaccine 0.5 milliLiter(s) IntraMuscular once  lactated ringers. 1000 milliLiter(s) (100 mL/Hr) IV Continuous <Continuous>  levothyroxine 50 MICROGram(s) Oral daily  levothyroxine 25 MICROGram(s) Oral daily  metFORMIN 1000 milliGRAM(s) Oral two times a day  sodium chloride 0.9% Bolus 500 milliLiter(s) IV Bolus once    MEDICATIONS  (PRN):  oxyCODONE    5 mG/acetaminophen 325 mG 1 Tablet(s) Oral every 4 hours PRN Moderate Pain (4 - 6)    REVIEW OF SYSTEMS:  CONSTITUTIONAL: No fever; + weight loss  EYES: R eye pain and discharge, currently improved  ENMT:  No difficulty hearing, tinnitus, vertigo; + facial sinus pain  NECK: + mild pain at incisional site  RESPIRATORY: + cough; no wheezing, chills or hemoptysis; No current shortness of breath  CARDIOVASCULAR: No chest pain, palpitations, dizziness, or leg swelling  GASTROINTESTINAL: No abdominal or epigastric pain. No nausea, vomiting, or hematemesis; No diarrhea or constipation. No melena or hematochezia.  GENITOURINARY: No dysuria, frequency, hematuria, or incontinence  NEUROLOGICAL: No headaches, memory loss, loss of strength, numbness, or tremors  SKIN: No itching, burning, rashes, or lesions   ENDOCRINE: No heat or cold intolerance; No hair loss  MUSCULOSKELETAL: No joint pain or swelling; No muscle, back, or extremity pain  PSYCHIATRIC: + difficulty sleeping  HEME/LYMPH: No easy bruising, or bleeding gums    RADIOLOGY & ADDITIONAL TESTS:  CXR no signficant findings when compared to prior imaging, increased gastric bubble    Imaging Personally Reviewed:  [x] YES  [ ] NO    Consultant(s) Notes Reviewed:  [x] YES  [ ] NO    PHYSICAL EXAM:  GENERAL: NAD, well-groomed, well-developed  HEAD:  Atraumatic, Normocephalic  EYES: EOMI, PERRLA, conjunctiva and sclera clear  ENMT: No tonsillar erythema, exudates, or enlargement; Moist mucous membranes, Good dentition, No lesions  NECK: Supple, No JVD, Normal thyroid  NERVOUS SYSTEM:  Alert & Oriented X3, Good concentration; Motor Strength 5/5 B/L upper and lower extremities; DTRs 2+ intact and symmetric  CHEST/LUNG: Clear to percussion bilaterally; No rales, rhonchi, wheezing, or rubs  HEART: Regular rate and rhythm; No murmurs, rubs, or gallops  ABDOMEN: Soft, Nontender, Nondistended; Bowel sounds present  EXTREMITIES:  2+ Peripheral Pulses, No clubbing, cyanosis, or edema  LYMPH: No lymphadenopathy noted  SKIN: No rashes or lesions    Care Discussed with Consultants/Other Providers [ ] YES  [ ] NO 65 y/o F PMH Hypothyroidism (alternating 25 and 50 mcg), T2DM (Metformin 1000 BID), and Recurrent Sinusitis & Sialadenitis presented for local excisional biopsy of submandibular gland w/ ENT - uncomplicated operative course w/ EBL 30 mL - post operative noted hypoxia requiring 2 L NC and medicine was consulted for further recommendations. Patient seen and examined at bedside w/ daughter present in NAD - states chronic symptoms and prior to OR today patient had been having nonproductive cough x 3 days but no fever. In general patient endorses night sweats and weight loss 10 lbs over month 2/2 poor PO intake and sleep. Patient is following ENT closely outpatient given severe persistent symptoms and possible concerns for lymphoma and leukemia - had trial of Prednisone 5 mg w/ improvement of her symptoms (hence considerations for Ig4 disease) but will defer steroid treatment until current incisional Bx results are known.     INTERVAL HPI/OVERNIGHT EVENTS:  T(C): 36.9 (10-21-19 @ 23:19), Max: 37.1 (10-21-19 @ 17:37)  HR: 99 (10-21-19 @ 23:19) (99 - 117)  BP: 93/51 (10-21-19 @ 23:19) (87/42 - 133/61)  RR: 18 (10-21-19 @ 23:19) (16 - 23)  SpO2: 98% (10-21-19 @ 23:19) (92% - 99%)    21 Oct 2019 07:01  -  22 Oct 2019 00:27  --------------------------------------------------------  IN: 900 mL / OUT: 0 mL / NET: 900 mL    PAST MEDICAL & SURGICAL HISTORY:  Neck swelling: right submandibular mass  Leukocytosis: 10/4  Anemia  Sialoadenitis  Type 2 diabetes mellitus  Nasal congestion  Sinus congestion  Hypothyroidism  S/P biopsy: right Parotid gland - 10/15/19    SOCIAL HISTORY  Alcohol: denied  Tobacco: denied  Illicit substance use: denied    FAMILY HISTORY: + DM; no FHx of VTE      LABS:                        10.8   21.28 )-----------( 587      ( 21 Oct 2019 23:59 )             33.1     10-21    135  |  101  |  11  ----------------------------<  250<H>  4.3   |  29  |  x     Ca    8.3<L>      21 Oct 2019 23:59  Mg     1.9     10-21    TPro  x   /  Alb  2.6<L>  /  TBili  x   /  DBili  x   /  AST  x   /  ALT  x   /  AlkPhos  x   10-21    PT/INR - ( 21 Oct 2019 23:59 )   PT: 13.5 sec;   INR: 1.21 ratio         PTT - ( 21 Oct 2019 23:59 )  PTT:31.0 sec    CAPILLARY BLOOD GLUCOSE    POCT Blood Glucose.: 168 mg/dL (21 Oct 2019 11:27)    MEDICATIONS  (STANDING):  guaiFENesin    Syrup 100 milliGRAM(s) Oral once  influenza   Vaccine 0.5 milliLiter(s) IntraMuscular once  lactated ringers. 1000 milliLiter(s) (100 mL/Hr) IV Continuous <Continuous>  levothyroxine 50 MICROGram(s) Oral daily  levothyroxine 25 MICROGram(s) Oral daily  metFORMIN 1000 milliGRAM(s) Oral two times a day  sodium chloride 0.9% Bolus 500 milliLiter(s) IV Bolus once    MEDICATIONS  (PRN):  oxyCODONE    5 mG/acetaminophen 325 mG 1 Tablet(s) Oral every 4 hours PRN Moderate Pain (4 - 6)    REVIEW OF SYSTEMS:  CONSTITUTIONAL: No fever; + weight loss  EYES: R eye pain and discharge, currently improved  ENMT:  No difficulty hearing, tinnitus, vertigo; + facial sinus pain  NECK: + mild pain at incisional site  RESPIRATORY: + cough; no wheezing, chills or hemoptysis; No current shortness of breath  CARDIOVASCULAR: No chest pain, palpitations, dizziness, or leg swelling  GASTROINTESTINAL: No abdominal or epigastric pain. No nausea, vomiting, or hematemesis; No diarrhea or constipation. No melena or hematochezia.  GENITOURINARY: No dysuria, frequency, hematuria, or incontinence  NEUROLOGICAL: No headaches, memory loss, loss of strength, numbness, or tremors  SKIN: No itching, burning, rashes, or lesions   ENDOCRINE: No heat or cold intolerance; No hair loss  MUSCULOSKELETAL: No joint pain or swelling; No muscle, back, or extremity pain  PSYCHIATRIC: + difficulty sleeping  HEME/LYMPH: No easy bruising, or bleeding gums    RADIOLOGY & ADDITIONAL TESTS:  CXR no signficant findings when compared to prior imaging, increased gastric bubble    Imaging Personally Reviewed:  [x] YES  [ ] NO    Consultant(s) Notes Reviewed:  [x] YES  [ ] NO    PHYSICAL EXAM:  GENERAL: NAD, well-groomed, well-developed  HEAD:  Atraumatic, Normocephalic  EYES: EOMI, PERRLA, conjunctiva and sclera clear  ENMT: No tonsillar erythema, exudates, or enlargement; Moist mucous membranes, Good dentition, No lesions  NECK: Supple, No JVD, Normal thyroid  NERVOUS SYSTEM:  Alert & Oriented X3, Good concentration; Motor Strength 5/5 B/L upper and lower extremities; DTRs 2+ intact and symmetric  CHEST/LUNG: Clear to percussion bilaterally; No rales, rhonchi, wheezing, or rubs  HEART: Regular rate and rhythm; No murmurs, rubs, or gallops  ABDOMEN: Soft, Nontender, Nondistended; Bowel sounds present  EXTREMITIES:  2+ Peripheral Pulses, No clubbing, cyanosis, or edema  LYMPH: No lymphadenopathy noted  SKIN: No rashes or lesions    Care Discussed with Consultants/Other Providers [ ] YES  [ ] NO

## 2019-10-22 NOTE — CONSULT NOTE ADULT - PROBLEM SELECTOR RECOMMENDATION 9
Likely postoperative hypoxia 2/2 atelectasis which has improved  - Post operative CXR reviewed; no PNA, pleural effusion, or other abnormalities  - Low clinical likelihood for PE; no further testing required, D-dimer unreliable in post OP setting  - Currently saturating 98% on 2 L NC; removed supplemental oxygen and recommend incentive spirometry

## 2019-10-22 NOTE — CONSULT NOTE ADULT - ATTENDING COMMENTS
I agree with the above information  Changes made above as needed    Thank you very much for allowing us to participate in the medical care of your patient!  Surgery management greatly appreciated  Thank you!    Available via text/call @ [phone]

## 2019-10-22 NOTE — CONSULT NOTE ADULT - ASSESSMENT
63 y/o F PMH Hypothyroidism (alternating 25 and 50 mcg), T2DM (Metformin 1000 BID), and Recurrent Sinusitis & Sialadenitis presented for local excisional biopsy of submandibular gland w/ ENT - uncomplicated operative course w/ EBL 30 mL - post operative noted hypoxia to 90 requiring 2 L NC and medicine was consulted for further recommendations. Patient seen and examined at bedside w/ daughter present in NAD - states chronic symptoms and prior to OR today patient had been having nonproductive cough x 3 days but no fever. In general patient endorses night sweats and weight loss 10 lbs over month 2/2 poor PO intake and sleep. Patient is following ENT closely outpatient given severe persistent symptoms and possible concerns for lymphoma and leukemia - had trial of Prednisone 5 mg w/ improvement of her symptoms (hence considerations for Ig4 disease) but will defer steroid treatment until current incisional Bx results are known. 63 y/o F PMH Hypothyroidism (alternating 25 and 50 mcg), T2DM (Metformin 1000 BID), and Recurrent Sinusitis & Sialadenitis presented for local excisional biopsy of submandibular gland w/ ENT - uncomplicated operative course w/ EBL 30 mL - post operative noted hypoxia to 90 requiring 2 L NC and medicine was consulted for further recommendations

## 2019-10-22 NOTE — DISCHARGE NOTE NURSING/CASE MANAGEMENT/SOCIAL WORK - PATIENT PORTAL LINK FT
You can access the FollowMyHealth Patient Portal offered by [facility] by registering at the following website: [email or web address]. By joining [employer]’s FollowMyHealth portal, you will also be able to view your health information using other applications (apps) compatible with our system.

## 2019-10-22 NOTE — CONSULT NOTE ADULT - PROBLEM SELECTOR RECOMMENDATION 3
H&H drop post operative possible dilutional, minor EBL 30 mL  - No current indications for transfusion; can follow outpatiently

## 2019-10-22 NOTE — PROGRESS NOTE ADULT - ASSESSMENT
64 F POD #1 s/p R submandibular gland incisional bx.   - Pain management PRN  - No need for supplemental O2  - Diet and activity as tolerated  - Will plan for Dc home today.

## 2019-10-22 NOTE — CONSULT NOTE ADULT - PROBLEM SELECTOR RECOMMENDATION 2
Known Hx; current increased N/L ratio 2/2 physiological stress from surgery and on examination no sources for infection; surgical site clean and intact  - FNA biopsy and flow cytometry results reviewed

## 2019-10-25 NOTE — DISCUSSION/SUMMARY
[Home] : patient was discharged to home [FreeTextEntry1] : Spoke with pt's daughter f/u s/p hospitalization, as per pt's daughter, pt has f/u visit appoint with surgeon next wk.

## 2019-10-29 ENCOUNTER — APPOINTMENT (OUTPATIENT)
Dept: RHEUMATOLOGY | Facility: CLINIC | Age: 64
End: 2019-10-29
Payer: MEDICAID

## 2019-10-29 ENCOUNTER — LABORATORY RESULT (OUTPATIENT)
Age: 64
End: 2019-10-29

## 2019-10-29 VITALS
DIASTOLIC BLOOD PRESSURE: 56 MMHG | OXYGEN SATURATION: 96 % | SYSTOLIC BLOOD PRESSURE: 95 MMHG | BODY MASS INDEX: 17.9 KG/M2 | WEIGHT: 94.8 LBS | HEIGHT: 61.02 IN | HEART RATE: 88 BPM | TEMPERATURE: 97.8 F

## 2019-10-29 PROCEDURE — 99204 OFFICE O/P NEW MOD 45 MIN: CPT

## 2019-10-29 NOTE — DATA REVIEWED
[FreeTextEntry1] : CT ABDOMEN AND PELVIS OC IC  \par PROCEDURE DATE:  10/04/2019  \par Multiple small bilateral axillary and inguinal lymph nodes which are \par normal in size and morphology.\par No splenomegaly.\par \par EXAM:  HAND BILAT (MINIMUM 3 VIEWS)                      \par \par \par PROCEDURE DATE:  06/19/2019  \par INTERPRETATION:  X-rays of bilateral hands\par \par Impression: No evidence for an acute fracture or dislocation.\par \par The joint spaces are preserved.\par \par The osseous mineralization is within normal limits.\par \par Bilateral lunotriquetral coalition.\par \par \par \par \par  EXAM:  XR CHEST PORTABLE URGENT 1V                      \par \par \par PROCEDURE DATE:  10/21/2019  \par The lungs demonstrate \par mild focal atelectasis versus scarring in the right CP angle\par \par EXAM:  CT MAXILLOFACIAL  IC  \par \par \par PROCEDURE DATE:  Oct  4 2019 \par Bilateral maxillary, ethmoid sphenoid and frontal sinus mucosal \par thickening is identified. No abnormal bony destruction is seen. \par \par The nasal septum is midline. \par \par Both OMUs are opacified.\par \par Mucosal thickening is seen involving both maxillary ethmoid sphenoid and \par frontal sinuses.\par \par Both globes appear normal symmetric. Evaluation of the intraorbital soft \par tissue structures appear normal\par \par There is soft tissue prominence overlying the body of the right mandible. \par This could be the result of prior trauma or underlying infectious \par etiology such as phlegmon. Please correlate clinically. \par \par DIAGNOSIS:\par Right submandibular gland:\par      - The lymphocyte immunophenotypic findings show no diagnostic abnormalities.\par \par Final Diagnosis\par \par Right submandibular salivary gland; biopsy:\par Portion of predominantly serous salivary gland with marked acute\par and chronic inflammation\par \par Verified by: Viv Newman M.D.\par (Electronic Signature)\par Reported on: 10/23/19 10:31 EDT, [address], [place] [address]\par \par Final Diagnosis\par SALIVARY GLAND, SUBMANDIBULAR, RIGHT, US GUIDED FNA AND CORE\par BIOPSY\par BENIGN FINDINGS.\par Acute and chronic sialadenitis with fibrotic changes.\par \par The cytology slides demonstrate scattered groups of acinar cells\par and ductal cells with admixed acute and chronic inflammation in a\par background of blood.\par

## 2019-10-29 NOTE — REVIEW OF SYSTEMS
[Fever] : no fever [Chills] : no chills [Shortness Of Breath] : no shortness of breath [Cough] : cough [As Noted in HPI] : as noted in HPI [Negative] : Endocrine [FreeTextEntry6] : dry cough worse at night  [de-identified] : s

## 2019-10-29 NOTE — ED POST DISCHARGE NOTE - OTHER COMMUNICATION
Patient's daughter called back. Results faxed to patient's ENT and PMD. Patietn will f/u to get Rx for CT. Return to the ED immediately if getting worse, not improving, or if having any new or troubling symptoms.

## 2019-10-29 NOTE — PHYSICAL EXAM
[General Appearance - Alert] : alert [General Appearance - In No Acute Distress] : in no acute distress [Sclera] : the sclera and conjunctiva were normal [Edema] : there was no peripheral edema [Abdomen Soft] : soft [Abdomen Tenderness] : non-tender [Cervical Lymph Nodes Enlarged Posterior Bilaterally] : posterior cervical [Cervical Lymph Nodes Enlarged Anterior Bilaterally] : anterior cervical [Axillary Lymph Nodes Enlarged Bilaterally] : axillary [Musculoskeletal - Swelling] : no joint swelling seen [Motor Tone] : muscle strength and tone were normal [FreeTextEntry1] : small erythematous patch over soles of the feet. big toe nail lysis, since young age as per patient  [Oriented To Time, Place, And Person] : oriented to person, place, and time

## 2019-10-29 NOTE — CONSULT LETTER
[Dear  ___] : Dear ~LAY, [Consult Letter:] : I had the pleasure of evaluating your patient, [unfilled]. [Please see my note below.] : Please see my note below. [Consult Closing:] : Thank you very much for allowing me to participate in the care of this patient.  If you have any questions, please do not hesitate to contact me. [Sincerely,] : Sincerely, [FreeTextEntry2] : ENT Dr Fuentes\par PMD Dr Camargo [FreeTextEntry3] : Yanet Calle MD\par

## 2019-10-29 NOTE — HISTORY OF PRESENT ILLNESS
[FreeTextEntry1] : 63 yo W, referred for evaluation of submandibular gland swelling, intractable sinusitis.\par \par \par -Started in June-July 2019, she developed sinus pain and congestion was diagnosed with sinusitis\par received a course of Abx with no improvement followed by a short course of  prednisone that provided relief. \par She subsequently noted swelling submandibular glands bilaterally. No fevers but felt warm\par s/p biopsy of sinus by ENT with cultures, she was given a course of  levofloxacin 10/4\par sent to another ENT Dr Fuentes, in view of persistence of swelling there was  concern for lymphoma Vs inflammatory etiology, she underwent on 10/21 open biopsy of the right submandibular gland.\par Pathology consistent with inflammation\par started in prednisone 60 mg 10/24 with instructions to taper, she reports improvement of her sinus symptoms and swelling of glands but persistent cough. \par - No prior similar episodes\par denies a history of recurrent sinusitis. otitis\par -Had an EGD normal\par =Other PMH of DM, hypothyroidism [de-identified] : \par Rheumatologic ROS:\par -+dry eyes\par - No oral ulcers\par - No Raynaud's \par - No rashes or photosensitivity\par small patches of erythema and excoriations sole of the feet, was told in the past psoriasis though she has no other spots in the body \par - No joint pain or swelling\par - No morning stiffness\par - No miscarriages or pregnancy complications\par - No history of blood clots\par - No family history of auto-immune disease  \par

## 2019-10-29 NOTE — ASSESSMENT
Left message to sched appt [FreeTextEntry1] : -Severe refractory sinusitis with mucosal thickening on CT, submandibular gland swelling\par s/p right SMG biopsy consistent with inflammatory changes, immunophenotypic studies normal.\par Symptoms improved with steroids. \par Highly suspicious for an inflammatory etiology once infectious and malignancy excluded.\par Would send a QTB, though likely to be indeterminate since on steroids \par Typically seen in patients with Sjogren's disease but her SSA/SSB were negative which rules it out.\par Other potential considerations include IgG4-RD, would need to review the pathology report with pathologist looking for IgG4 staining and for certain features suggestive of the disease ( presence of lymphoplasmacytic infiltrates, storiform fibrosis....) I reached out to Dr Newman who read the biopsy, awaiting reply.\par Continue with steroids as prescribed, has a follow up with Dr Fuentes \par -Persistent dry cough, not improving with therapy. denies SOB\par would recommend to purse a CT chest in view of opacity noted on CXR.\par \par \par Patient aware of my assessment and plan, all questions answered.\par RTO in 4 weeks\par

## 2019-10-29 NOTE — REASON FOR VISIT
[Consultation] : a consultation visit [Spouse] : spouse [Family Member] : family member [Source: ______] : History obtained from [unfilled]

## 2019-10-30 ENCOUNTER — APPOINTMENT (OUTPATIENT)
Dept: INTERNAL MEDICINE | Facility: CLINIC | Age: 64
End: 2019-10-30

## 2019-10-30 ENCOUNTER — RESULT REVIEW (OUTPATIENT)
Age: 64
End: 2019-10-30

## 2019-10-30 ENCOUNTER — EMERGENCY (EMERGENCY)
Facility: HOSPITAL | Age: 64
LOS: 1 days | Discharge: HOME | End: 2019-10-30
Attending: EMERGENCY MEDICINE | Admitting: COLON & RECTAL SURGERY
Payer: MEDICAID

## 2019-10-30 ENCOUNTER — TRANSCRIPTION ENCOUNTER (OUTPATIENT)
Age: 64
End: 2019-10-30

## 2019-10-30 VITALS
RESPIRATION RATE: 18 BRPM | DIASTOLIC BLOOD PRESSURE: 56 MMHG | SYSTOLIC BLOOD PRESSURE: 101 MMHG | HEART RATE: 80 BPM | TEMPERATURE: 98 F | OXYGEN SATURATION: 98 %

## 2019-10-30 VITALS
DIASTOLIC BLOOD PRESSURE: 63 MMHG | RESPIRATION RATE: 16 BRPM | OXYGEN SATURATION: 98 % | HEART RATE: 88 BPM | SYSTOLIC BLOOD PRESSURE: 119 MMHG | TEMPERATURE: 98 F

## 2019-10-30 DIAGNOSIS — Z98.890 OTHER SPECIFIED POSTPROCEDURAL STATES: Chronic | ICD-10-CM

## 2019-10-30 LAB
ALBUMIN SERPL ELPH-MCNC: 3.6 G/DL — SIGNIFICANT CHANGE UP (ref 3.3–5)
ALP SERPL-CCNC: 55 U/L — SIGNIFICANT CHANGE UP (ref 40–120)
ALT FLD-CCNC: 15 U/L — SIGNIFICANT CHANGE UP (ref 4–33)
ANION GAP SERPL CALC-SCNC: 14 MMO/L — SIGNIFICANT CHANGE UP (ref 7–14)
AST SERPL-CCNC: 13 U/L — SIGNIFICANT CHANGE UP (ref 4–32)
BASOPHILS # BLD AUTO: 0.07 K/UL — SIGNIFICANT CHANGE UP (ref 0–0.2)
BASOPHILS NFR BLD AUTO: 0.5 % — SIGNIFICANT CHANGE UP (ref 0–2)
BILIRUB SERPL-MCNC: < 0.2 MG/DL — LOW (ref 0.2–1.2)
BUN SERPL-MCNC: 17 MG/DL — SIGNIFICANT CHANGE UP (ref 7–23)
CALCIUM SERPL-MCNC: 9.6 MG/DL — SIGNIFICANT CHANGE UP (ref 8.4–10.5)
CHLORIDE SERPL-SCNC: 96 MMOL/L — LOW (ref 98–107)
CO2 SERPL-SCNC: 29 MMOL/L — SIGNIFICANT CHANGE UP (ref 22–31)
CREAT SERPL-MCNC: 0.63 MG/DL — SIGNIFICANT CHANGE UP (ref 0.5–1.3)
EOSINOPHIL # BLD AUTO: 0.06 K/UL — SIGNIFICANT CHANGE UP (ref 0–0.5)
EOSINOPHIL NFR BLD AUTO: 0.4 % — SIGNIFICANT CHANGE UP (ref 0–6)
GLUCOSE SERPL-MCNC: 115 MG/DL — HIGH (ref 70–99)
HCT VFR BLD CALC: 32 % — LOW (ref 34.5–45)
HGB BLD-MCNC: 10.2 G/DL — LOW (ref 11.5–15.5)
IMM GRANULOCYTES NFR BLD AUTO: 0.9 % — SIGNIFICANT CHANGE UP (ref 0–1.5)
LYMPHOCYTES # BLD AUTO: 24.8 % — SIGNIFICANT CHANGE UP (ref 13–44)
LYMPHOCYTES # BLD AUTO: 3.73 K/UL — HIGH (ref 1–3.3)
MAGNESIUM SERPL-MCNC: 2.3 MG/DL — SIGNIFICANT CHANGE UP (ref 1.6–2.6)
MCHC RBC-ENTMCNC: 30.5 PG — SIGNIFICANT CHANGE UP (ref 27–34)
MCHC RBC-ENTMCNC: 31.9 % — LOW (ref 32–36)
MCV RBC AUTO: 95.8 FL — SIGNIFICANT CHANGE UP (ref 80–100)
MONOCYTES # BLD AUTO: 1.23 K/UL — HIGH (ref 0–0.9)
MONOCYTES NFR BLD AUTO: 8.2 % — SIGNIFICANT CHANGE UP (ref 2–14)
NEUTROPHILS # BLD AUTO: 9.85 K/UL — HIGH (ref 1.8–7.4)
NEUTROPHILS NFR BLD AUTO: 65.2 % — SIGNIFICANT CHANGE UP (ref 43–77)
NRBC # FLD: 0 K/UL — SIGNIFICANT CHANGE UP (ref 0–0)
PHOSPHATE SERPL-MCNC: 3.5 MG/DL — SIGNIFICANT CHANGE UP (ref 2.5–4.5)
PLATELET # BLD AUTO: 738 K/UL — HIGH (ref 150–400)
PMV BLD: 9.6 FL — SIGNIFICANT CHANGE UP (ref 7–13)
POTASSIUM SERPL-MCNC: 4.2 MMOL/L — SIGNIFICANT CHANGE UP (ref 3.5–5.3)
POTASSIUM SERPL-SCNC: 4.2 MMOL/L — SIGNIFICANT CHANGE UP (ref 3.5–5.3)
PROT SERPL-MCNC: 6.8 G/DL — SIGNIFICANT CHANGE UP (ref 6–8.3)
RBC # BLD: 3.34 M/UL — LOW (ref 3.8–5.2)
RBC # FLD: 13.2 % — SIGNIFICANT CHANGE UP (ref 10.3–14.5)
SODIUM SERPL-SCNC: 139 MMOL/L — SIGNIFICANT CHANGE UP (ref 135–145)
WBC # BLD: 15.07 K/UL — HIGH (ref 3.8–10.5)
WBC # FLD AUTO: 15.07 K/UL — HIGH (ref 3.8–10.5)

## 2019-10-30 PROCEDURE — 93010 ELECTROCARDIOGRAM REPORT: CPT

## 2019-10-30 PROCEDURE — 99283 EMERGENCY DEPT VISIT LOW MDM: CPT | Mod: 25

## 2019-10-30 NOTE — ED PROVIDER NOTE - PHYSICAL EXAMINATION
Gen: NAD, AOx3, able to make needs known, non-toxic  Head: NCAT  HEENT: EOMI, oral mucosa moist, normal conjunctiva. Bandage to R side of neck at bx site  Lung: CTAB, no respiratory distress, no wheezes/rhonchi/rales B/L, speaking in full sentences  CV: RRR, no murmurs  Abd: soft, NTND, no guarding  MSK: no visible deformities  Neuro: No focal sensory or motor deficits  Skin: Warm, well perfused  Psych: normal affect

## 2019-10-30 NOTE — ED PROVIDER NOTE - ATTENDING CONTRIBUTION TO CARE
Afebrile. Awake and Alert. Lungs CTA. Heart RRR. Abdomen soft NTND. CN II-XII grossly intact. Moves all extremities without lateralization.    High potassium on outpt labs  No h/o renal dysfunction and no potassium supplements  re-check K

## 2019-10-30 NOTE — ED PROVIDER NOTE - PMH
Anemia    Cough  x 3 weeks , no productive  History of weight loss    Hypothyroidism    Leukocytosis  10/4/- 21k, today 25 k- pt broought to ER, Dr. Fuentes Surgeon notified , Kesha Ashley NP  notified  Nasal congestion    Neck swelling  right submandibular mass  Sialoadenitis    Sinus congestion    Type 2 diabetes mellitus

## 2019-10-30 NOTE — ED ADULT TRIAGE NOTE - CHIEF COMPLAINT QUOTE
Pt had blood work drawn today at rheumatologist for inflammatory workup and was told to come to ED for elevated k+. As per daughter it was "6 point something". Pt had open biopsy last week to the right submandibular gland which was normal per daughter (acute/chronic inflammation). Pt recently placed on high dose steroids by ENT. C/o persistent dry cough and sob. Denies chest pain. Pt had recent xray showing rib nodule and was told to get CT chest. H/o dm, hypothyroid.      Please call Natasha (daughter) with any questions [phone]

## 2019-10-30 NOTE — ED ADULT NURSE NOTE - NSFALLRSKASSESSDT_ED_ALL_ED
Attempted to call patient to confirm appointment with Dr. Da Silva tomorrow 08/16/2019.   30-Oct-2019 02:39

## 2019-10-30 NOTE — ED PROVIDER NOTE - CLINICAL SUMMARY MEDICAL DECISION MAKING FREE TEXT BOX
63 y/o F w/ PMH of hypothyroid, DM presenting w/ elevated potassium. Will recheck labs to eval for hyperK. EKG at this time not consistent w/ hyperK. Will provide tx if necessary. Treat and admit vs. discharge pending lab results. Reassess.

## 2019-10-30 NOTE — ED ADULT NURSE NOTE - NSIMPLEMENTINTERV_GEN_ALL_ED
Implemented All Universal Safety Interventions:  [place] to call system. Call bell, personal items and telephone within reach. Instruct patient to call for assistance. Room bathroom lighting operational. Non-slip footwear when patient is off stretcher. Physically safe environment: no spills, clutter or unnecessary equipment. Stretcher in lowest position, wheels locked, appropriate side rails in place.

## 2019-10-30 NOTE — ED PROVIDER NOTE - PATIENT PORTAL LINK FT
You can access the FollowMyHealth Patient Portal offered by [facility] by registering at the following website: [email or web address]. By joining [facility] [employer]’s FollowMyHealth portal, you will also be able to view your health information using other applications (apps) compatible with our system.

## 2019-10-30 NOTE — ED PROVIDER NOTE - OBJECTIVE STATEMENT
65 y/o F w/ PMH of hypothyroid, DM presenting w/ elevated potassium. Pt presents with . Primarily French speaking w/ some English. Requesting to speak w/ daughter via telephone and refused the offered translation services. Pt was reportedly sent in by her rheumatologist after blood work obtained on 10/29 showed an elevated potassium. Pt was recently admitted for submandibular gland bx after prolonged course of nasal congestion. Reportedly was concerning initially for lymphoma, but bx reported as consistent w/ chronic infection. Was following up w/ rheum to [facility] for underlying rheumatological diseases and had blood work done on 10/29 which resulted in the reported abnormal values. Reports dry cough for a few weeks, but aside from that denies additional complaints. Denies fevers, chills, headache, dizziness, chest pain, shortness of breath, abdominal pain, n/v/d/c, urinary symptoms, MSK pain.

## 2019-10-30 NOTE — ED ADULT NURSE NOTE - CHIEF COMPLAINT QUOTE
Pt had blood work drawn today at rheumatologist for inflammatory workup and was told to come to ED for elevated k+. As per daughter it was "6 point something". Pt had open biopsy last week to the right submandibular gland which was normal per daughter (acute/chronic inflammation). Pt recently placed on high dose steroids by ENT. C/o persistent dry cough and sob. Denies chest pain. Pt had recent xray showing rib nodule and was told to get CT chest. H/o dm, hypothyroid.      Please call Natasha (daughter) with any questions [phone]

## 2019-10-31 ENCOUNTER — APPOINTMENT (OUTPATIENT)
Dept: OTOLARYNGOLOGY | Facility: CLINIC | Age: 64
End: 2019-10-31
Payer: MEDICAID

## 2019-10-31 VITALS
WEIGHT: 95 LBS | HEART RATE: 79 BPM | DIASTOLIC BLOOD PRESSURE: 61 MMHG | SYSTOLIC BLOOD PRESSURE: 96 MMHG | BODY MASS INDEX: 18.65 KG/M2 | HEIGHT: 60 IN

## 2019-10-31 PROCEDURE — 88185 FLOWCYTOMETRY/TC ADD-ON: CPT

## 2019-10-31 PROCEDURE — C1889: CPT

## 2019-10-31 PROCEDURE — 87205 SMEAR GRAM STAIN: CPT

## 2019-10-31 PROCEDURE — 82962 GLUCOSE BLOOD TEST: CPT

## 2019-10-31 PROCEDURE — 85027 COMPLETE CBC AUTOMATED: CPT

## 2019-10-31 PROCEDURE — 83735 ASSAY OF MAGNESIUM: CPT

## 2019-10-31 PROCEDURE — 88305 TISSUE EXAM BY PATHOLOGIST: CPT

## 2019-10-31 PROCEDURE — 85610 PROTHROMBIN TIME: CPT

## 2019-10-31 PROCEDURE — 85730 THROMBOPLASTIN TIME PARTIAL: CPT

## 2019-10-31 PROCEDURE — 71045 X-RAY EXAM CHEST 1 VIEW: CPT

## 2019-10-31 PROCEDURE — 80053 COMPREHEN METABOLIC PANEL: CPT

## 2019-10-31 PROCEDURE — 99024 POSTOP FOLLOW-UP VISIT: CPT

## 2019-10-31 PROCEDURE — 88184 FLOWCYTOMETRY/ TC 1 MARKER: CPT

## 2019-10-31 PROCEDURE — 84100 ASSAY OF PHOSPHORUS: CPT

## 2019-10-31 PROCEDURE — 36415 COLL VENOUS BLD VENIPUNCTURE: CPT

## 2019-10-31 NOTE — CONSULT LETTER
[Dear  ___] : Dear  [unfilled], [Courtesy Letter:] : I had the pleasure of seeing your patient, [unfilled], in my office today. [Please see my note below.] : Please see my note below. [Consult Closing:] : Thank you very much for allowing me to participate in the care of this patient.  If you have any questions, please do not hesitate to contact me. [Sincerely,] : Sincerely, [FreeTextEntry2] : Dr Douglas Mcdermott [FreeTextEntry3] : \par Jordi Fuentes MD, FACS\par \par Otolaryngology-Head and Neck Surgery\par [employer] [facility]\par

## 2019-10-31 NOTE — HISTORY OF PRESENT ILLNESS
[de-identified] : Patient referred by Dr Mcdermott for submandibular mass. Patient started her symptoms in the end of June 2019. Patient had frontal headaches, bilateral eye pressure and neck tenderness. Shortly after that she started having nasal obstruction. On 7/24/19 she had sinus CT. Was started on a few courses of Abx, last one was Levo, which she has been taking at this time.\par No fever, but had occasional night sweats. Lost 8 lbs in the last month.\par Had an open biopsy which just showed acute and chronic inflammation. Patient has improvement of the nasal obstruction, neck swelling, weakness and night sweats.\par \par

## 2019-10-31 NOTE — REASON FOR VISIT
[Subsequent Evaluation] : a subsequent evaluation for [FreeTextEntry2] : s/p open biopsy of R submandibular gland on 10/21/19

## 2019-10-31 NOTE — PHYSICAL EXAM
[de-identified] : Incision C/D/I. [de-identified] : Congested [Midline] : trachea located in midline position [Normal] : no rashes [de-identified] : Presence of erythema and edema of the R lower lid.

## 2019-11-01 LAB
ALBUMIN MFR SERPL ELPH: 53.5 %
ALBUMIN SERPL ELPH-MCNC: 3.9 G/DL
ALBUMIN SERPL-MCNC: 3.7 G/DL
ALBUMIN/GLOB SERPL: 1.2 RATIO
ALBUPE: 9.2 %
ALP BLD-CCNC: 66 U/L
ALPHA1 GLOB MFR SERPL ELPH: 5.7 %
ALPHA1 GLOB SERPL ELPH-MCNC: 0.4 G/DL
ALPHA1UPE: 39.6 %
ALPHA2 GLOB MFR SERPL ELPH: 12.4 %
ALPHA2 GLOB SERPL ELPH-MCNC: 0.9 G/DL
ALPHA2UPE: 23.5 %
ALT SERPL-CCNC: 19 U/L
ANION GAP SERPL CALC-SCNC: 13 MMOL/L
APPEARANCE: CLEAR
AST SERPL-CCNC: 14 U/L
B-GLOBULIN MFR SERPL ELPH: 12.3 %
B-GLOBULIN SERPL ELPH-MCNC: 0.8 G/DL
BACTERIA: NEGATIVE
BASOPHILS # BLD AUTO: 0.08 K/UL
BASOPHILS NFR BLD AUTO: 0.5 %
BETAUPE: 8.2 %
BILIRUB SERPL-MCNC: <0.2 MG/DL
BILIRUBIN URINE: NEGATIVE
BLOOD URINE: NEGATIVE
BUN SERPL-MCNC: 17 MG/DL
CALCIUM SERPL-MCNC: 10.2 MG/DL
CHLORIDE SERPL-SCNC: 93 MMOL/L
CO2 SERPL-SCNC: 28 MMOL/L
COLOR: COLORLESS
CREAT 24H UR-MCNC: NORMAL G/24 H
CREAT SERPL-MCNC: 0.55 MG/DL
CREAT SPEC-SCNC: 14 MG/DL
CREAT/PROT UR: NORMAL RATIO
CREATININE UR (MAYO): 14 MG/DL
DEPRECATED KAPPA LC FREE/LAMBDA SER: 0.85 RATIO
EOSINOPHIL # BLD AUTO: 0 K/UL
EOSINOPHIL NFR BLD AUTO: 0 %
ERYTHROCYTE [SEDIMENTATION RATE] IN BLOOD BY WESTERGREN METHOD: 14 MM/HR
GAMMA GLOB FLD ELPH-MCNC: 1.1 G/DL
GAMMA GLOB MFR SERPL ELPH: 16.1 %
GAMMAUPE: 19.5 %
GLUCOSE QUALITATIVE U: ABNORMAL
GLUCOSE SERPL-MCNC: 297 MG/DL
HCT VFR BLD CALC: 35.4 %
HGB BLD-MCNC: 11.1 G/DL
HYALINE CASTS: 0 /LPF
IGA 24H UR QL IFE: NORMAL
IGA SER QL IEP: 348 MG/DL
IGG SER QL IEP: 1223 MG/DL
IGM SER QL IEP: 59 MG/DL
IMM GRANULOCYTES NFR BLD AUTO: 1.1 %
INTERPRETATION SERPL IEP-IMP: NORMAL
KAPPA LC 24H UR QL: NORMAL
KAPPA LC CSF-MCNC: 1.31 MG/DL
KAPPA LC SERPL-MCNC: 1.11 MG/DL
KETONES URINE: NEGATIVE
LEUKOCYTE ESTERASE URINE: NEGATIVE
LYMPHOCYTES # BLD AUTO: 1.57 K/UL
LYMPHOCYTES NFR BLD AUTO: 8.9 %
M PROTEIN SPEC IFE-MCNC: NORMAL
M TB IFN-G BLD-IMP: NEGATIVE
MAN DIFF?: NORMAL
MCHC RBC-ENTMCNC: 30.5 PG
MCHC RBC-ENTMCNC: 31.4 GM/DL
MCV RBC AUTO: 97.3 FL
MICROSCOPIC-UA: NORMAL
MONOCYTES # BLD AUTO: 0.4 K/UL
MONOCYTES NFR BLD AUTO: 2.3 %
MPO AB + PR3 PNL SER: NORMAL
NEUTROPHILS # BLD AUTO: 15.41 K/UL
NEUTROPHILS NFR BLD AUTO: 87.2 %
NITRITE URINE: NEGATIVE
PH URINE: 7
PLATELET # BLD AUTO: 814 K/UL
POTASSIUM SERPL-SCNC: 6.2 MMOL/L
PROT PATTERN 24H UR ELPH-IMP: NORMAL
PROT SERPL-MCNC: 6.8 G/DL
PROT SERPL-MCNC: 6.9 G/DL
PROT SERPL-MCNC: 6.9 G/DL
PROT UR-MCNC: 4 MG/DL
PROT UR-MCNC: 4 MG/DL
PROT UR-MCNC: <4 MG/DL
PROTEIN URINE: NEGATIVE
QUANTIFERON TB PLUS MITOGEN MINUS NIL: 3.96 IU/ML
QUANTIFERON TB PLUS NIL: 0.01 IU/ML
QUANTIFERON TB PLUS TB1 MINUS NIL: 0 IU/ML
QUANTIFERON TB PLUS TB2 MINUS NIL: 0.01 IU/ML
RBC # BLD: 3.64 M/UL
RBC # FLD: 13.1 %
RED BLOOD CELLS URINE: 0 /HPF
SODIUM SERPL-SCNC: 134 MMOL/L
SPECIFIC GRAVITY URINE: 1.01
SPECIMEN VOL 24H UR: NORMAL ML
SQUAMOUS EPITHELIAL CELLS: 0 /HPF
UROBILINOGEN URINE: NORMAL
WBC # FLD AUTO: 17.66 K/UL
WHITE BLOOD CELLS URINE: 0 /HPF

## 2019-11-18 ENCOUNTER — FORM ENCOUNTER (OUTPATIENT)
Age: 64
End: 2019-11-18

## 2019-11-19 ENCOUNTER — APPOINTMENT (OUTPATIENT)
Dept: INTERNAL MEDICINE | Facility: CLINIC | Age: 64
End: 2019-11-19
Payer: MEDICAID

## 2019-11-19 ENCOUNTER — APPOINTMENT (OUTPATIENT)
Dept: CT IMAGING | Facility: IMAGING CENTER | Age: 64
End: 2019-11-19
Payer: MEDICAID

## 2019-11-19 ENCOUNTER — OUTPATIENT (OUTPATIENT)
Dept: OUTPATIENT SERVICES | Facility: HOSPITAL | Age: 64
LOS: 1 days | End: 2019-11-19
Payer: MEDICAID

## 2019-11-19 VITALS
HEART RATE: 100 BPM | OXYGEN SATURATION: 99 % | SYSTOLIC BLOOD PRESSURE: 112 MMHG | HEIGHT: 60 IN | WEIGHT: 95 LBS | TEMPERATURE: 98 F | DIASTOLIC BLOOD PRESSURE: 70 MMHG | BODY MASS INDEX: 18.65 KG/M2

## 2019-11-19 DIAGNOSIS — Z98.890 OTHER SPECIFIED POSTPROCEDURAL STATES: Chronic | ICD-10-CM

## 2019-11-19 DIAGNOSIS — K11.8 OTHER DISEASES OF SALIVARY GLANDS: ICD-10-CM

## 2019-11-19 PROCEDURE — 71260 CT THORAX DX C+: CPT | Mod: 26

## 2019-11-19 PROCEDURE — 99213 OFFICE O/P EST LOW 20 MIN: CPT | Mod: 25

## 2019-11-19 PROCEDURE — 71260 CT THORAX DX C+: CPT

## 2019-11-19 PROCEDURE — 94010 BREATHING CAPACITY TEST: CPT

## 2019-12-03 ENCOUNTER — LABORATORY RESULT (OUTPATIENT)
Age: 64
End: 2019-12-03

## 2019-12-03 ENCOUNTER — APPOINTMENT (OUTPATIENT)
Dept: RHEUMATOLOGY | Facility: CLINIC | Age: 64
End: 2019-12-03
Payer: MEDICAID

## 2019-12-03 VITALS
HEART RATE: 98 BPM | DIASTOLIC BLOOD PRESSURE: 54 MMHG | WEIGHT: 93.25 LBS | OXYGEN SATURATION: 98 % | TEMPERATURE: 98 F | HEIGHT: 60.63 IN | BODY MASS INDEX: 17.84 KG/M2 | SYSTOLIC BLOOD PRESSURE: 85 MMHG

## 2019-12-03 PROCEDURE — 99214 OFFICE O/P EST MOD 30 MIN: CPT

## 2019-12-03 NOTE — ASSESSMENT
[FreeTextEntry1] : 1)Acute episode of severe  sinusitis with mucosal thickening on CT, bilateral submandibular gland swelling\par s/p right SMG biopsy consistent with inflammatory changes, immunophenotypic studies normal. Cultures negative\par Discussed with pathologist with addition of IgG4 staining: rare plasma cells, IgG4 +<5%\par Symptoms improved with steroids. \par \par -Highly suspicious for an inflammatory etiology once infectious and malignancy excluded.\par Negative cultures, no viral serologies performed. QTB and HCV/HBV negative \par Flow cytometry normal, normal SPEP/UPEP\par For inflammatory etiologies of chronic multifocal sialoadenitis, differential include:\par *Sjogren's disease but her SSA/SSB were negative\par *AAV, her ANCA titers were negative\par *IgG4-RD, normal peripheral IgG4 levels, no increase IgG4 plasma cells in tissue\par \par Recommend:\par -continue off steroids for now as symptoms have almost resolved, no evidence of recurrence while off steroids\par -continue with regular massage of the SMG and adequate hydration, NSAIDs as needed for pain\par -advised to follow up with ENT as they are concerned for persistent SMG enlargement, though non painful at this time\par -Will complete serologic work up today (ACE, Vitamin D,,,)\par -No indication for immunosuppressive agent at this time, CT A/P and Chest negative for organ involvement or LNs\par \par \par 2)Persistent dry cough, exacerbated by strong odors and cold exposure ? reactive airway disease\par Non smoker, no occupational exposure \par CT chest negative for masses/nodules/LNs but with diffuse bronchial wall thickening \par -Recommend full PFTs, Pulmonary evaluation\par \par 3) Persistent scaly patches soles of feet\par was told psoriasis in the past, no resolving with steroid cream\par Dermatology referral provided\par \par 4) Bone health\par check Vitamin D\par DEXA 2019: normal, low FRAX\par 5)Other PMH:\par DM, on metformin\par Hypothyroidism on levothyroxine\par \par Patient aware of my assessment and plan, all questions answered.\par RTO in 5-6 weeks\par

## 2019-12-03 NOTE — HISTORY OF PRESENT ILLNESS
[de-identified] : Since last visit,  [FreeTextEntry1] : -persistent cough, worse in the morning coughs up white/yellowish mucus. no blood\par also triggered by strong odors and cold \par taking hydrocodone syrup prescribed by PMD which helped with the cough\par no fevers/chills\par no SOB\par -completed the steroids on 11/5\par -otherwise feels that sinuses/nose cleared completely\par no neck swelling but persistent enlargement of the submandibular glands bilaterally\par \par \par

## 2019-12-03 NOTE — PHYSICAL EXAM
[General Appearance - In No Acute Distress] : in no acute distress [General Appearance - Alert] : alert [Sclera] : the sclera and conjunctiva were normal [Auscultation Breath Sounds / Voice Sounds] : lungs were clear to auscultation bilaterally [Heart Sounds] : normal S1 and S2 [Abdomen Soft] : soft [Abdomen Tenderness] : non-tender [Edema] : there was no peripheral edema [Cervical Lymph Nodes Enlarged Posterior Bilaterally] : posterior cervical [Cervical Lymph Nodes Enlarged Anterior Bilaterally] : anterior cervical [Abnormal Walk] : normal gait [FreeTextEntry1] : scaly patches over soles of feet bilaterally  [Motor Tone] : muscle strength and tone were normal [Oriented To Time, Place, And Person] : oriented to person, place, and time [No Focal Deficits] : no focal deficits

## 2019-12-03 NOTE — REVIEW OF SYSTEMS
[Fever] : no fever [Chills] : no chills [As Noted in HPI] : as noted in HPI [Negative] : Psychiatric

## 2019-12-03 NOTE — DATA REVIEWED
[FreeTextEntry1] : Addendum: rare plasma cells, IgG4 +plasma cells <5 %\par \par CT chest: 11/29/19\par no lymphadenopathy\par no nodules, masses\par diffuse bronchial wall thickening \par \par \par Surgical Final Report\par \par Right submandibular salivary gland; biopsy:\par Portion of predominantly serous salivary gland with marked acute\par and chronic inflammation\par \par \par C-Reactive Protein: 3.60 mg/dL \par Anti SS-B Antibody: 0.2 Al Reference Range <=0.9\par Anti SS-A Antibody: <0.2 Al Reference Range <=0.9\par Immunoglobulin Subclass IgG4, Serum \par Immunoglobulin Subclass IgG4, Serum: 14.6 mg/dL Reference Range 2.4 - 121.0\par Antinuclear Ab (EDGARDO): Negative  Reference Range <1:80\par \par Data Reviewed\par CT ABDOMEN AND PELVIS OC IC \par PROCEDURE DATE: 10/04/2019 \par Multiple small bilateral axillary and inguinal lymph nodes which are \par normal in size and morphology.\par No splenomegaly.\par \par EXAM: HAND BILAT (MINIMUM 3 VIEWS) \par \par \par PROCEDURE DATE: 06/19/2019 \par INTERPRETATION: X-rays of bilateral hands\par \par Impression: No evidence for an acute fracture or dislocation.\par \par The joint spaces are preserved.\par \par The osseous mineralization is within normal limits.\par \par Bilateral lunotriquetral coalition.\par \par \par \par \par  EXAM: XR CHEST PORTABLE URGENT 1V \par \par \par PROCEDURE DATE: 10/21/2019 \par The lungs demonstrate \par mild focal atelectasis versus scarring in the right CP angle\par \par EXAM: CT MAXILLOFACIAL IC \par \par \par PROCEDURE DATE: Oct 4 2019 \par Bilateral maxillary, ethmoid sphenoid and frontal sinus mucosal \par thickening is identified. No abnormal bony destruction is seen. \par \par The nasal septum is midline. \par \par Both OMUs are opacified.\par \par Mucosal thickening is seen involving both maxillary ethmoid sphenoid and \par frontal sinuses.\par \par Both globes appear normal symmetric. Evaluation of the intraorbital soft \par tissue structures appear normal\par \par There is soft tissue prominence overlying the body of the right mandible. \par This could be the result of prior trauma or underlying infectious \par etiology such as phlegmon. Please correlate clinically. 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

## 2019-12-05 ENCOUNTER — APPOINTMENT (OUTPATIENT)
Dept: INTERNAL MEDICINE | Facility: CLINIC | Age: 64
End: 2019-12-05
Payer: MEDICAID

## 2019-12-05 VITALS
HEIGHT: 60.63 IN | DIASTOLIC BLOOD PRESSURE: 60 MMHG | BODY MASS INDEX: 18.36 KG/M2 | WEIGHT: 96 LBS | SYSTOLIC BLOOD PRESSURE: 100 MMHG

## 2019-12-05 PROCEDURE — 94010 BREATHING CAPACITY TEST: CPT

## 2019-12-05 PROCEDURE — 99214 OFFICE O/P EST MOD 30 MIN: CPT | Mod: 25

## 2019-12-05 NOTE — HISTORY OF PRESENT ILLNESS
[FreeTextEntry1] : cough [de-identified] : 2 months of constant clear-yellowish productive cough. Intermittent sinus congestion.  History of bilateral submandibular gland swelling.  Followed by ENT and rheumatologist.  no fever.  No rash

## 2019-12-05 NOTE — PHYSICAL EXAM
[No Acute Distress] : no acute distress [Well Nourished] : well nourished [Normal Outer Ear/Nose] : the outer ears and nose were normal in appearance [Supple] : supple [No Respiratory Distress] : no respiratory distress  [Clear to Auscultation] : lungs were clear to auscultation bilaterally [No Accessory Muscle Use] : no accessory muscle use [Soft] : abdomen soft [Normal Rate] : normal rate  [Regular Rhythm] : with a regular rhythm [No Spinal Tenderness] : no spinal tenderness [No CVA Tenderness] : no CVA  tenderness [Cyanosis] : no cyanosis [Normal Gait] : normal gait [Coordination Grossly Intact] : coordination grossly intact [Abnormal Temperature] : normal temperature [Normal Affect] : the affect was normal [Normal Mood] : the mood was normal [Speech Grossly Normal] : speech grossly normal

## 2019-12-05 NOTE — ASSESSMENT
[FreeTextEntry1] : 2 months of cough.  unclear etiology.  Seen by ENT and rheumatologist for bilateral submandibular gland swelling and sinus congestion.  CT of chest was done  in November 2019.  refer to [facility].\par Continue same thyroid medication.  Follow TFT.\par Continue same diabetes medication.  Follow hemoglobin A1C.\par

## 2019-12-09 ENCOUNTER — APPOINTMENT (OUTPATIENT)
Dept: PULMONOLOGY | Facility: CLINIC | Age: 64
End: 2019-12-09
Payer: MEDICAID

## 2019-12-09 VITALS
SYSTOLIC BLOOD PRESSURE: 92 MMHG | BODY MASS INDEX: 18.85 KG/M2 | WEIGHT: 96.01 LBS | DIASTOLIC BLOOD PRESSURE: 59 MMHG | HEIGHT: 60 IN | OXYGEN SATURATION: 96 % | TEMPERATURE: 98 F | HEART RATE: 99 BPM | RESPIRATION RATE: 15 BRPM

## 2019-12-09 LAB
24R-OH-CALCIDIOL SERPL-MCNC: 55.8 PG/ML
25(OH)D3 SERPL-MCNC: 40 NG/ML
ACE BLD-CCNC: 52 U/L
ALBUMIN SERPL ELPH-MCNC: 4.4 G/DL
ALBUPE: 17.6 %
ALP BLD-CCNC: 66 U/L
ALPHA1UPE: 36.3 %
ALPHA2UPE: 19.9 %
ALT SERPL-CCNC: 7 U/L
ANION GAP SERPL CALC-SCNC: 17 MMOL/L
AST SERPL-CCNC: 12 U/L
BASOPHILS # BLD AUTO: 0.19 K/UL
BASOPHILS NFR BLD AUTO: 1 %
BETAUPE: 15.8 %
BILIRUB SERPL-MCNC: 0.2 MG/DL
BUN SERPL-MCNC: 18 MG/DL
C3 SERPL-MCNC: 125 MG/DL
C4 SERPL-MCNC: 38 MG/DL
CALCIUM SERPL-MCNC: 10.5 MG/DL
CCP AB SER IA-ACNC: 32 UNITS
CENTROMERE IGG SER-ACNC: <0.2 CD:130001892
CHLORIDE SERPL-SCNC: 100 MMOL/L
CO2 SERPL-SCNC: 26 MMOL/L
CREAT 24H UR-MCNC: NORMAL G/24 H
CREAT SERPL-MCNC: 0.54 MG/DL
CREATININE UR (MAYO): 64 MG/DL
CRP SERPL-MCNC: 0.73 MG/DL
DSDNA AB SER-ACNC: <12 IU/ML
ENA RNP AB SER IA-ACNC: 0.2 AL
ENA SCL70 IGG SER IA-ACNC: <0.2 AL
ENA SM AB SER IA-ACNC: <0.2 AL
EOSINOPHIL # BLD AUTO: 1.15 K/UL
EOSINOPHIL NFR BLD AUTO: 6.1 %
ERYTHROCYTE [SEDIMENTATION RATE] IN BLOOD BY WESTERGREN METHOD: 31 MM/HR
GAMMAUPE: 10.4 %
GLUCOSE SERPL-MCNC: 148 MG/DL
HCT VFR BLD CALC: 38.5 %
HGB BLD-MCNC: 12 G/DL
IGA 24H UR QL IFE: NORMAL
IMM GRANULOCYTES NFR BLD AUTO: 0.7 %
KAPPA LC 24H UR QL: NORMAL
LYMPHOCYTES # BLD AUTO: 2.28 K/UL
LYMPHOCYTES NFR BLD AUTO: 12.2 %
MAN DIFF?: NORMAL
MCHC RBC-ENTMCNC: 31.2 GM/DL
MCHC RBC-ENTMCNC: 31.2 PG
MCV RBC AUTO: 100 FL
MONOCYTES # BLD AUTO: 1.07 K/UL
MONOCYTES NFR BLD AUTO: 5.7 %
MYELOPEROXIDASE AB SER QL IA: <5 UNITS
MYELOPEROXIDASE CELLS FLD QL: NEGATIVE
NEUTROPHILS # BLD AUTO: 13.94 K/UL
NEUTROPHILS NFR BLD AUTO: 74.3 %
PLATELET # BLD AUTO: 515 K/UL
POTASSIUM SERPL-SCNC: 5 MMOL/L
PROT PATTERN 24H UR ELPH-IMP: NORMAL
PROT SERPL-MCNC: 7.1 G/DL
PROT UR-MCNC: 9 MG/DL
PROT UR-MCNC: 9 MG/DL
PROTEINASE3 AB SER IA-ACNC: <5 UNITS
PROTEINASE3 AB SER-ACNC: NEGATIVE
RBC # BLD: 3.85 M/UL
RBC # FLD: 13.6 %
RF+CCP IGG SER-IMP: ABNORMAL
RHEUMATOID FACT SER QL: <10 IU/ML
SODIUM SERPL-SCNC: 143 MMOL/L
SPECIMEN VOL 24H UR: NORMAL ML
WBC # FLD AUTO: 18.76 K/UL

## 2019-12-09 PROCEDURE — 94729 DIFFUSING CAPACITY: CPT

## 2019-12-09 PROCEDURE — ZZZZZ: CPT

## 2019-12-09 PROCEDURE — 99204 OFFICE O/P NEW MOD 45 MIN: CPT | Mod: 25

## 2019-12-09 PROCEDURE — 94726 PLETHYSMOGRAPHY LUNG VOLUMES: CPT

## 2019-12-09 PROCEDURE — 94060 EVALUATION OF WHEEZING: CPT

## 2019-12-10 NOTE — CONSULT LETTER
[Dear  ___] : Dear  [unfilled], [Consult Letter:] : I had the pleasure of evaluating your patient, [unfilled]. [Please see my note below.] : Please see my note below. [Consult Closing:] : Thank you very much for allowing me to participate in the care of this patient.  If you have any questions, please do not hesitate to contact me. [FreeTextEntry3] : Babita Handy MD\par Pulmonary, Critical Care, and Sleep Medicine\par  of Medicine\par [employer] at [facility]  [Sincerely,] : Sincerely,

## 2019-12-10 NOTE — PHYSICAL EXAM
[Normal Appearance] : normal appearance [General Appearance - Well Developed] : well developed [Well Groomed] : well groomed [General Appearance - Well Nourished] : well nourished [No Deformities] : no deformities [Normal Conjunctiva] : the conjunctiva exhibited no abnormalities [General Appearance - In No Acute Distress] : no acute distress [Erythema] : erythema of the pharynx [II] : II [Neck Appearance] : the appearance of the neck was normal [Heart Rate And Rhythm] : heart rate and rhythm were normal [Heart Sounds] : normal S1 and S2 [Murmurs] : no murmurs present [Arterial Pulses Normal] : the arterial pulses were normal [] : no respiratory distress [Respiration, Rhythm And Depth] : normal respiratory rhythm and effort [Auscultation Breath Sounds / Voice Sounds] : lungs were clear to auscultation bilaterally [Exaggerated Use Of Accessory Muscles For Inspiration] : no accessory muscle use [Nail Clubbing] : no clubbing of the fingernails [Cyanosis, Localized] : no localized cyanosis [Abnormal Walk] : normal gait [Nail Splinter Hemorrhages] : no splinter hemorrhages of the nails [Petechial Hemorrhages (___cm)] : no petechial hemorrhages [Skin Color & Pigmentation] : normal skin color and pigmentation [Oriented To Time, Place, And Person] : oriented to person, place, and time [No Focal Deficits] : no focal deficits [Impaired Insight] : insight and judgment were intact [Mood] : the mood was normal [Affect] : the affect was normal

## 2019-12-10 NOTE — ASSESSMENT
[FreeTextEntry1] : 64 year old female with chronic cough\par - Suspect post-nasal drip as etiology\par - Recommend daily sinus rinses and Flonase\par - PFT performed today shows moderate obstruction with no significant bronchodilator response, normal lung volumes and mildly reduced diffusion.\par - Reviewed CT chest 11/19 which shows bronchial wall thickening indicative of airway inflammation\par - Steroid taper completed on 11/5\par - Will trial Pulmicort\par - Follow up in 4 - 6 weeks

## 2019-12-10 NOTE — REVIEW OF SYSTEMS
[Nasal Congestion] : nasal congestion [Postnasal Drip] : postnasal drip [Sinus Problems] : sinus problems [Cough] : cough [Sputum] : sputum  [Negative] : Psychiatric [Hemoptysis] : no hemoptysis [Dyspnea] : no dyspnea

## 2019-12-10 NOTE — CONSULT LETTER
[Dear  ___] : Dear  [unfilled], [Consult Closing:] : Thank you very much for allowing me to participate in the care of this patient.  If you have any questions, please do not hesitate to contact me. [Consult Letter:] : I had the pleasure of evaluating your patient, [unfilled]. [Please see my note below.] : Please see my note below. [Sincerely,] : Sincerely, [FreeTextEntry3] : Babita Handy MD\par Pulmonary, Critical Care, and Sleep Medicine\par  of Medicine\par [employer] at [facility]

## 2019-12-10 NOTE — PHYSICAL EXAM
[General Appearance - Well Developed] : well developed [Normal Appearance] : normal appearance [Well Groomed] : well groomed [No Deformities] : no deformities [General Appearance - Well Nourished] : well nourished [General Appearance - In No Acute Distress] : no acute distress [Normal Conjunctiva] : the conjunctiva exhibited no abnormalities [Erythema] : erythema of the pharynx [II] : II [Neck Appearance] : the appearance of the neck was normal [Heart Sounds] : normal S1 and S2 [Heart Rate And Rhythm] : heart rate and rhythm were normal [Murmurs] : no murmurs present [Arterial Pulses Normal] : the arterial pulses were normal [Respiration, Rhythm And Depth] : normal respiratory rhythm and effort [] : no respiratory distress [Exaggerated Use Of Accessory Muscles For Inspiration] : no accessory muscle use [Auscultation Breath Sounds / Voice Sounds] : lungs were clear to auscultation bilaterally [Cyanosis, Localized] : no localized cyanosis [Nail Clubbing] : no clubbing of the fingernails [Abnormal Walk] : normal gait [Nail Splinter Hemorrhages] : no splinter hemorrhages of the nails [Petechial Hemorrhages (___cm)] : no petechial hemorrhages [Skin Color & Pigmentation] : normal skin color and pigmentation [Oriented To Time, Place, And Person] : oriented to person, place, and time [No Focal Deficits] : no focal deficits [Impaired Insight] : insight and judgment were intact [Mood] : the mood was normal [Affect] : the affect was normal

## 2019-12-10 NOTE — HISTORY OF PRESENT ILLNESS
[FreeTextEntry1] : 64 year old female referred for chronic cough.\par \par She developed persistent productive cough about 2 months prior. Started about 4 months ago she developed persistent sinonasal symptoms that started with facial pain/pressure, rhinorrhea, headaches, and fever. She was given multiple courses of antibiotics including amoxicillin, Augmentin, levofloxacin as well as courses of prednisone with persistent swelling and symptoms including swelling in submandibular region with weight loss, fatigue, anorexia, and night sweats. Concerned for lymphoma vs. inflammatory etiology, has sinus biopsy which showed inflammation as well as a right submandibular gland biopsy which showed acute and chronic inflammation. Was given another course of prednisone taper with improvement in sinus inflammation but developed cough. Does not notice significant post-nasal drip any longer but cough is persistent productive of yellowish sputum. Cough is worse when lying down. Denies hemoptysis. Has some left rib pain related to coughing. Takes Robitussin, Mucinex, and Hycodan with little relief. \par \par

## 2019-12-10 NOTE — REASON FOR VISIT
[Consultation] : a consultation visit [Cough] : cough [Sleep Apnea] : sleep apnea [FreeTextEntry1] : Referred by Dr. Camargo

## 2019-12-10 NOTE — REVIEW OF SYSTEMS
[Nasal Congestion] : nasal congestion [Sinus Problems] : sinus problems [Cough] : cough [Postnasal Drip] : postnasal drip [Sputum] : sputum  [Negative] : Endocrine [Hemoptysis] : no hemoptysis [Dyspnea] : no dyspnea

## 2019-12-11 LAB — RNA POLYMERASE III IGG: <10 U

## 2019-12-23 ENCOUNTER — INBOUND DOCUMENT (OUTPATIENT)
Age: 64
End: 2019-12-23

## 2019-12-31 ENCOUNTER — APPOINTMENT (OUTPATIENT)
Dept: PULMONOLOGY | Facility: CLINIC | Age: 64
End: 2019-12-31

## 2020-01-09 ENCOUNTER — APPOINTMENT (OUTPATIENT)
Dept: OTOLARYNGOLOGY | Facility: CLINIC | Age: 65
End: 2020-01-09
Payer: MEDICAID

## 2020-01-09 VITALS
BODY MASS INDEX: 15.99 KG/M2 | WEIGHT: 96 LBS | HEART RATE: 80 BPM | HEIGHT: 65 IN | DIASTOLIC BLOOD PRESSURE: 60 MMHG | SYSTOLIC BLOOD PRESSURE: 95 MMHG

## 2020-01-09 PROCEDURE — 31231 NASAL ENDOSCOPY DX: CPT

## 2020-01-09 PROCEDURE — 99214 OFFICE O/P EST MOD 30 MIN: CPT | Mod: 25

## 2020-01-09 RX ORDER — CETIRIZINE HYDROCHLORIDE 10 MG/1
10 TABLET, FILM COATED ORAL
Qty: 60 | Refills: 0 | Status: ACTIVE | COMMUNITY
Start: 2020-01-09 | End: 1900-01-01

## 2020-01-09 NOTE — PHYSICAL EXAM
[de-identified] : Incision C/D/I. [de-identified] : Congested [Midline] : trachea located in midline position [Normal] : no rashes [de-identified] : Presence of erythema and edema of the R lower lid.

## 2020-01-09 NOTE — REASON FOR VISIT
[Subsequent Evaluation] : a subsequent evaluation for [[employer] ] : provided by [employer]   [FreeTextEntry1] : [id number] [FreeTextEntry2] : tammy [TWNoteComboBox1] : Hebrew

## 2020-01-09 NOTE — CONSULT LETTER
[Courtesy Letter:] : I had the pleasure of seeing your patient, [unfilled], in my office today. [Dear  ___] : Dear  [unfilled], [Please see my note below.] : Please see my note below. [Sincerely,] : Sincerely, [Consult Closing:] : Thank you very much for allowing me to participate in the care of this patient.  If you have any questions, please do not hesitate to contact me. [FreeTextEntry2] : Dr Douglas Mcdermott [FreeTextEntry3] : \par Jordi Fuentes MD, FACS\par \par Otolaryngology-Head and Neck Surgery\par [employer] [facility]\par

## 2020-01-09 NOTE — HISTORY OF PRESENT ILLNESS
[de-identified] : Patient referred by Dr Mcdermott for submandibular mass. Patient started her symptoms in the end of June 2019. Patient had frontal headaches, bilateral eye pressure and neck tenderness. Shortly after that she started having nasal obstruction. On 7/24/19 she had sinus CT. Was started on a few courses of Abx, last one was Levo, which she has been taking at this time.\par Had an open biopsy which just showed acute and chronic inflammation. Patient has improvement the nasal obstruction, neck swelling, weakness and night sweats.\par today pt c.o continue productive cough ( yellow then clear later on day) since last visit, pt saw pulm tx with steroid and cough med with improved but still continue coughing.Pulm  think is from sinusitis with postnasal drip triggering cough. pt was told to use nasal spray and sinus rinse, flonase and flovent.\par No nasal obstruction, sneezing, itchy watery eyes or nasal drainage.\par pt is being f/u by rheum and pulm.\par per pt the submandibular glans is smaller and still palp, no pain or wt loss\par Complete review of systems which was performed during a previous encounter was reviewed with the patient and there are no changes except as stated in the HPI section.\par \par

## 2020-01-15 ENCOUNTER — APPOINTMENT (OUTPATIENT)
Dept: PULMONOLOGY | Facility: CLINIC | Age: 65
End: 2020-01-15
Payer: MEDICAID

## 2020-01-15 ENCOUNTER — LABORATORY RESULT (OUTPATIENT)
Age: 65
End: 2020-01-15

## 2020-01-15 VITALS
WEIGHT: 95 LBS | RESPIRATION RATE: 16 BRPM | TEMPERATURE: 98.8 F | HEART RATE: 85 BPM | SYSTOLIC BLOOD PRESSURE: 93 MMHG | DIASTOLIC BLOOD PRESSURE: 61 MMHG | BODY MASS INDEX: 15.83 KG/M2 | HEIGHT: 65 IN

## 2020-01-15 PROCEDURE — 99214 OFFICE O/P EST MOD 30 MIN: CPT

## 2020-01-15 RX ORDER — ALBUTEROL SULFATE 90 UG/1
108 (90 BASE) AEROSOL, METERED RESPIRATORY (INHALATION)
Qty: 1 | Refills: 0 | Status: ACTIVE | COMMUNITY
Start: 2020-01-15 | End: 1900-01-01

## 2020-01-16 LAB
BASOPHILS # BLD AUTO: 0.11 K/UL
BASOPHILS NFR BLD AUTO: 0.8 %
EOSINOPHIL # BLD AUTO: 0.76 K/UL
EOSINOPHIL NFR BLD AUTO: 5.8 %
HCT VFR BLD CALC: 36.2 %
HGB BLD-MCNC: 11.3 G/DL
IMM GRANULOCYTES NFR BLD AUTO: 0.3 %
LYMPHOCYTES # BLD AUTO: 3.02 K/UL
LYMPHOCYTES NFR BLD AUTO: 23.1 %
MAN DIFF?: NORMAL
MCHC RBC-ENTMCNC: 29.8 PG
MCHC RBC-ENTMCNC: 31.2 GM/DL
MCV RBC AUTO: 95.5 FL
MONOCYTES # BLD AUTO: 0.88 K/UL
MONOCYTES NFR BLD AUTO: 6.7 %
NEUTROPHILS # BLD AUTO: 8.29 K/UL
NEUTROPHILS NFR BLD AUTO: 63.3 %
PLATELET # BLD AUTO: 523 K/UL
RBC # BLD: 3.79 M/UL
RBC # FLD: 13.8 %
WBC # FLD AUTO: 13.1 K/UL

## 2020-01-16 NOTE — HISTORY OF PRESENT ILLNESS
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 She feels as if the cough is coming from her chest. She has also been using Pulmicort without significant improvement. Having rib pain from all the coughing and she wakes up multiple times during the night due to cough. She is able find some relief after expectorating. Denies fevers, chills.\par \par \par History:\par \par She developed persistent productive cough around October 2019. Prior to that, around June 2019 she developed persistent sinonasal symptoms that started with facial pain/pressure, rhinorrhea, headaches, and fever. She was given multiple courses of antibiotics including amoxicillin, Augmentin, levofloxacin as well as courses of prednisone with persistent swelling and symptoms including swelling in submandibular region with weight loss, fatigue, anorexia, and night sweats. Concerned for lymphoma vs. inflammatory etiology, has sinus biopsy which showed inflammation as well as a right submandibular gland biopsy which showed acute and chronic inflammation. Was given another course of prednisone taper with improvement in sinus inflammation but developed cough. Does not notice significant post-nasal drip any longer but cough is persistent productive of yellowish sputum. Cough is worse when lying down. Denies hemoptysis. Has some left rib pain related to coughing. Takes Robitussin, Mucinex, and Hycodan with little relief. \par \par

## 2020-01-16 NOTE — REVIEW OF SYSTEMS
[Sinus Problems] : sinus problems [Cough] : cough [Sputum] : sputum  [Negative] : Endocrine [Nasal Congestion] : nasal congestion [Postnasal Drip] : no postnasal drip [Hemoptysis] : no hemoptysis [Dyspnea] : no dyspnea

## 2020-01-16 NOTE — ASSESSMENT
[FreeTextEntry1] : 64 year old female with chronic cough, not significantly improved with sinus rinses and Flonase.\par - Review of prior lab work reveals mild eosinophilia. Check CBC with differential, IgE level, allergen panel\par - Continue Pulmicort BID\par - Add Singulair 10 mg daily and Ventolin prn\par - Will trial short course of steroids - Prednisone 40 mg x 7 days\par - Follow up in 3 weeks

## 2020-01-20 LAB — TOTAL IGE SMQN RAST: 39 KU/L

## 2020-01-21 ENCOUNTER — APPOINTMENT (OUTPATIENT)
Dept: RHEUMATOLOGY | Facility: CLINIC | Age: 65
End: 2020-01-21

## 2020-01-27 ENCOUNTER — APPOINTMENT (OUTPATIENT)
Dept: INTERNAL MEDICINE | Facility: CLINIC | Age: 65
End: 2020-01-27
Payer: MEDICAID

## 2020-01-27 PROCEDURE — 36415 COLL VENOUS BLD VENIPUNCTURE: CPT

## 2020-01-28 ENCOUNTER — RESULT REVIEW (OUTPATIENT)
Age: 65
End: 2020-01-28

## 2020-01-28 LAB
ALBUMIN SERPL ELPH-MCNC: 4.2 G/DL
ALP BLD-CCNC: 60 U/L
ALT SERPL-CCNC: 12 U/L
ANION GAP SERPL CALC-SCNC: 11 MMOL/L
AST SERPL-CCNC: 15 U/L
BASOPHILS # BLD AUTO: 0.16 K/UL
BASOPHILS NFR BLD AUTO: 1.4 %
BILIRUB SERPL-MCNC: 0.2 MG/DL
BUN SERPL-MCNC: 15 MG/DL
CALCIUM SERPL-MCNC: 9.9 MG/DL
CHLORIDE SERPL-SCNC: 100 MMOL/L
CO2 SERPL-SCNC: 27 MMOL/L
CREAT SERPL-MCNC: 0.59 MG/DL
EOSINOPHIL # BLD AUTO: 0.62 K/UL
EOSINOPHIL NFR BLD AUTO: 5.3 %
ESTIMATED AVERAGE GLUCOSE: 151 MG/DL
GLUCOSE SERPL-MCNC: 127 MG/DL
HBA1C MFR BLD HPLC: 6.9 %
HCT VFR BLD CALC: 41 %
HGB BLD-MCNC: 12.7 G/DL
IMM GRANULOCYTES NFR BLD AUTO: 0.3 %
LYMPHOCYTES # BLD AUTO: 2.85 K/UL
LYMPHOCYTES NFR BLD AUTO: 24.6 %
MAN DIFF?: NORMAL
MCHC RBC-ENTMCNC: 30.4 PG
MCHC RBC-ENTMCNC: 31 GM/DL
MCV RBC AUTO: 98.1 FL
MONOCYTES # BLD AUTO: 0.8 K/UL
MONOCYTES NFR BLD AUTO: 6.9 %
NEUTROPHILS # BLD AUTO: 7.13 K/UL
NEUTROPHILS NFR BLD AUTO: 61.5 %
PLATELET # BLD AUTO: 451 K/UL
POTASSIUM SERPL-SCNC: 4.5 MMOL/L
PROT SERPL-MCNC: 7.3 G/DL
RBC # BLD: 4.18 M/UL
RBC # FLD: 14.1 %
SAVE SPECIMEN: NORMAL
SODIUM SERPL-SCNC: 138 MMOL/L
T4 FREE SERPL-MCNC: 1.6 NG/DL
TSH SERPL-ACNC: 2.64 UIU/ML
WBC # FLD AUTO: 11.59 K/UL

## 2020-02-03 ENCOUNTER — APPOINTMENT (OUTPATIENT)
Dept: INTERNAL MEDICINE | Facility: CLINIC | Age: 65
End: 2020-02-03

## 2020-02-03 ENCOUNTER — APPOINTMENT (OUTPATIENT)
Dept: INTERNAL MEDICINE | Facility: CLINIC | Age: 65
End: 2020-02-03
Payer: MEDICAID

## 2020-02-03 VITALS
BODY MASS INDEX: 15.83 KG/M2 | WEIGHT: 95 LBS | DIASTOLIC BLOOD PRESSURE: 60 MMHG | HEIGHT: 65 IN | SYSTOLIC BLOOD PRESSURE: 110 MMHG

## 2020-02-03 PROCEDURE — 90471 IMMUNIZATION ADMIN: CPT

## 2020-02-03 PROCEDURE — 99214 OFFICE O/P EST MOD 30 MIN: CPT | Mod: 25

## 2020-02-03 PROCEDURE — 90715 TDAP VACCINE 7 YRS/> IM: CPT

## 2020-02-03 NOTE — HISTORY OF PRESENT ILLNESS
[FreeTextEntry1] : diabetes and hypothyroid [de-identified] : still has persistent clear-yellowish productive cough. Given prednisone by pulmonologist with minimal improvement. No fever. No SOB. Still has persistent right submandibular gland enlargement.

## 2020-02-03 NOTE — PHYSICAL EXAM
[No Acute Distress] : no acute distress [Well Nourished] : well nourished [Well-Appearing] : well-appearing [Normal Outer Ear/Nose] : the outer ears and nose were normal in appearance [Well Developed] : well developed [Supple] : supple [No Accessory Muscle Use] : no accessory muscle use [No Respiratory Distress] : no respiratory distress  [Normal Rate] : normal rate  [Clear to Auscultation] : lungs were clear to auscultation bilaterally [Regular Rhythm] : with a regular rhythm [Soft] : abdomen soft [No CVA Tenderness] : no CVA  tenderness [No Spinal Tenderness] : no spinal tenderness [Abnormal Temperature] : normal temperature [Cyanosis] : no cyanosis [Coordination Grossly Intact] : coordination grossly intact [Normal Gait] : normal gait [Speech Grossly Normal] : speech grossly normal [Normal Affect] : the affect was normal [Normal Mood] : the mood was normal

## 2020-02-03 NOTE — ASSESSMENT
[FreeTextEntry1] : Continue same thyroid medication.  Follow TFT.\par Continue same diabetes medication.  Follow hemoglobin A1C.  Keep A1C<7.0.\par follow CBC\par cough-scheduled to see pulm. for follow up in 10 days.\par right submandibular swelling-per ENT\par Tdap

## 2020-02-12 ENCOUNTER — APPOINTMENT (OUTPATIENT)
Dept: PULMONOLOGY | Facility: CLINIC | Age: 65
End: 2020-02-12
Payer: MEDICAID

## 2020-02-12 VITALS
BODY MASS INDEX: 15.99 KG/M2 | SYSTOLIC BLOOD PRESSURE: 100 MMHG | RESPIRATION RATE: 16 BRPM | DIASTOLIC BLOOD PRESSURE: 66 MMHG | TEMPERATURE: 98 F | OXYGEN SATURATION: 96 % | HEART RATE: 100 BPM | WEIGHT: 96 LBS | HEIGHT: 65 IN

## 2020-02-12 PROCEDURE — 99214 OFFICE O/P EST MOD 30 MIN: CPT

## 2020-02-12 NOTE — PHYSICAL EXAM
[No Acute Distress] : no acute distress [No Deformities] : no deformities [Normal Oropharynx] : normal oropharynx [Erythema] : erythema [II] : Mallampati Class: II [Normal Appearance] : normal appearance [Normal Rate/Rhythm] : normal rate/rhythm [Normal S1, S2] : normal s1, s2 [No Murmurs] : no murmurs [No Resp Distress] : no resp distress [Clear to Auscultation Bilaterally] : clear to auscultation bilaterally [Benign] : benign [No Abnormalities] : no abnormalities [Normal Gait] : normal gait [No Clubbing] : no clubbing [No Cyanosis] : no cyanosis [No Edema] : no edema [Normal Color/ Pigmentation] : normal color/ pigmentation [No Focal Deficits] : no focal deficits [Normal Affect] : normal affect [Oriented x3] : oriented x3

## 2020-02-12 NOTE — HISTORY OF PRESENT ILLNESS
[Never] : never [TextBox_4] : 64 year old female here for follow up of chronic cough.\par \par She reports resolution of cough with prednisone 40 x 7 days but cough recurred about 1 week after stopping. Feels sinus congestion is now worse and has nasal congestion. No significant sputum production - just occasional in AM. Denies fevers, chills. Remains on Pulmicort and Singulair.\par \par History:\par She developed persistent productive cough around October 2019. Prior to that, around June 2019 she developed persistent sinonasal symptoms that started with facial pain/pressure, rhinorrhea, headaches, and fever. She was given multiple courses of antibiotics including amoxicillin, Augmentin, levofloxacin as well as courses of prednisone with persistent swelling and symptoms including swelling in submandibular region with weight loss, fatigue, anorexia, and night sweats. Concerned for lymphoma vs. inflammatory etiology, has sinus biopsy which showed inflammation as well as a right submandibular gland biopsy which showed acute and chronic inflammation. Was given another course of prednisone taper with improvement in sinus inflammation but developed cough. Does not notice significant post-nasal drip any longer but cough is persistent productive of yellowish sputum. Cough is worse when lying down. Denies hemoptysis. Has some left rib pain related to coughing. Takes Robitussin, Mucinex, and Hycodan with little relief. \par \par

## 2020-02-12 NOTE — REVIEW OF SYSTEMS
[Nasal Congestion] : nasal congestion [Sinus Problems] : sinus problems [Cough] : cough [Dyspnea] : dyspnea [Thyroid Problem] : thyroid problem [SOB on Exertion] : sob on exertion [Negative] : Psychiatric [Fever] : no fever [Chills] : no chills [Postnasal Drip] : no postnasal drip [Chest Tightness] : no chest tightness [Pleuritic Pain] : no pleuritic pain [Wheezing] : no wheezing

## 2020-02-12 NOTE — ASSESSMENT
[FreeTextEntry1] : 64 year old female with chronic cough\par - Cough was improved with prednisone and recurred after stopping\par - Labs drawn at last visit again showed mild eosinophilia that improved at check with PCP shortly after completing course of steroids. IgE level was normal. 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

## 2020-02-13 ENCOUNTER — OUTPATIENT (OUTPATIENT)
Dept: OUTPATIENT SERVICES | Facility: HOSPITAL | Age: 65
LOS: 1 days | End: 2020-02-13

## 2020-02-13 VITALS
HEART RATE: 92 BPM | TEMPERATURE: 98 F | DIASTOLIC BLOOD PRESSURE: 70 MMHG | RESPIRATION RATE: 16 BRPM | OXYGEN SATURATION: 96 % | SYSTOLIC BLOOD PRESSURE: 98 MMHG | HEIGHT: 60.5 IN | WEIGHT: 91.93 LBS

## 2020-02-13 DIAGNOSIS — J40 BRONCHITIS, NOT SPECIFIED AS ACUTE OR CHRONIC: ICD-10-CM

## 2020-02-13 DIAGNOSIS — Z98.890 OTHER SPECIFIED POSTPROCEDURAL STATES: Chronic | ICD-10-CM

## 2020-02-13 LAB
ANION GAP SERPL CALC-SCNC: 14 MMO/L — SIGNIFICANT CHANGE UP (ref 7–14)
BUN SERPL-MCNC: 14 MG/DL — SIGNIFICANT CHANGE UP (ref 7–23)
CALCIUM SERPL-MCNC: 10.2 MG/DL — SIGNIFICANT CHANGE UP (ref 8.4–10.5)
CHLORIDE SERPL-SCNC: 97 MMOL/L — LOW (ref 98–107)
CO2 SERPL-SCNC: 27 MMOL/L — SIGNIFICANT CHANGE UP (ref 22–31)
CREAT SERPL-MCNC: 0.54 MG/DL — SIGNIFICANT CHANGE UP (ref 0.5–1.3)
GLUCOSE SERPL-MCNC: 102 MG/DL — HIGH (ref 70–99)
HBA1C BLD-MCNC: 6.9 % — HIGH (ref 4–5.6)
HCT VFR BLD CALC: 38.2 % — SIGNIFICANT CHANGE UP (ref 34.5–45)
HGB BLD-MCNC: 12.5 G/DL — SIGNIFICANT CHANGE UP (ref 11.5–15.5)
MCHC RBC-ENTMCNC: 30.8 PG — SIGNIFICANT CHANGE UP (ref 27–34)
MCHC RBC-ENTMCNC: 32.7 % — SIGNIFICANT CHANGE UP (ref 32–36)
MCV RBC AUTO: 94.1 FL — SIGNIFICANT CHANGE UP (ref 80–100)
NRBC # FLD: 0 K/UL — SIGNIFICANT CHANGE UP (ref 0–0)
PLATELET # BLD AUTO: 506 K/UL — HIGH (ref 150–400)
PMV BLD: 10.3 FL — SIGNIFICANT CHANGE UP (ref 7–13)
POTASSIUM SERPL-MCNC: 5.2 MMOL/L — SIGNIFICANT CHANGE UP (ref 3.5–5.3)
POTASSIUM SERPL-SCNC: 5.2 MMOL/L — SIGNIFICANT CHANGE UP (ref 3.5–5.3)
RBC # BLD: 4.06 M/UL — SIGNIFICANT CHANGE UP (ref 3.8–5.2)
RBC # FLD: 13 % — SIGNIFICANT CHANGE UP (ref 10.3–14.5)
SODIUM SERPL-SCNC: 138 MMOL/L — SIGNIFICANT CHANGE UP (ref 135–145)
WBC # BLD: 13.68 K/UL — HIGH (ref 3.8–10.5)
WBC # FLD AUTO: 13.68 K/UL — HIGH (ref 3.8–10.5)

## 2020-02-13 RX ORDER — SODIUM CHLORIDE 9 MG/ML
1000 INJECTION, SOLUTION INTRAVENOUS
Refills: 0 | Status: DISCONTINUED | OUTPATIENT
Start: 2020-02-19 | End: 2020-02-19

## 2020-02-13 NOTE — H&P PST ADULT - HISTORY OF PRESENT ILLNESS
64 year old female presents to presurgical testing with diagnosis of bronchitis, not specified as acute or chronic. Pt complaining of cough since 10/2019, with persistent symptoms despite treatment with steroids, and right submandibular gland swelling s/p biopsy in 10/2019 64 year old female presents to presurgical testing with diagnosis of bronchitis, not specified as acute or chronic scheduled for bronchoscopy. Pt complaining of cough since 10/2019, with persistent symptoms despite treatment with steroids, and right submandibular gland swelling s/p biopsy in 10/2019

## 2020-02-13 NOTE — H&P PST ADULT - ASSESSMENT
bronchitis, not specified as acute or chronic Problem: bronchitis, not specified as acute or chronic   Assessment and Plan: Pt is tentatively scheduled for bronchoscopy for 2/19/20. Pre-op instructions provided. Pt given verbal and written instructions with teach back on pepcid. Pt verbalized understanding with return demonstration.     Last pulmonary and medical note in chart.    Problem: hypothyroidism  Assessment and Plan: Pt instructed to take levothyroxine on the morning of procedure.     Problem: Diabetes  Assessment and plan: Pt instructed to take last dose of metformin on 2/18 AM. PMH Diabetes,  OR booking notified

## 2020-02-13 NOTE — H&P PST ADULT - NEGATIVE GENERAL GENITOURINARY SYMPTOMS
no renal colic/no urinary hesitancy/no incontinence/no dysuria/no hematuria/no flank pain L/no flank pain R/normal urinary frequency/no nocturia

## 2020-02-13 NOTE — H&P PST ADULT - NSICDXPASTMEDICALHX_GEN_ALL_CORE_FT
PAST MEDICAL HISTORY:  Anemia     Bronchitis, not specified as acute or chronic     Cough chronic    History of weight loss     Hypothyroidism     Leukocytosis     Nasal congestion     Neck swelling right submandibular mass    Sialoadenitis     Sinus congestion     Type 2 diabetes mellitus

## 2020-02-13 NOTE — H&P PST ADULT - RS GEN PE MLT RESP DETAILS PC
no rhonchi/good air movement/breath sounds equal/no rales/respirations non-labored/no wheezes/clear to auscultation bilaterally/airway patent

## 2020-02-13 NOTE — H&P PST ADULT - NSICDXPROBLEM_GEN_ALL_CORE_FT
PROBLEM DIAGNOSES  Problem: Leukocytosis  Assessment and Plan: Pt had WBC 21 K on 10/4/19, at ER, got Levaquin PO, finished abx , todays WBC 25.19, unable to reach NP Mikayla Diaz NP . pt 's PCP , unable to reach Surgeon , DR. Fuentes, , called Kesha Ashley NP ,  , pt brought to ER for evaluation, left message for  covering medical team in [facility] for NP John , spoke with covering NP to rely message to SHERRY Degroot, spoke to DR. Fuentes, as per him pt eastman not need to have medical clearance , advised to discuss with Anesthesia , Dr. Trivedi , pt went to ER in stable condition without distress     Problem: Sialoadenitis  Assessment and Plan: Patient  is scheduled for left side incisional biopsy of salivary gland and excision of left submandibular gland on 10/21/19.     Problem: Hypothyroidism  Assessment and Plan: Pt advised to take thyroid medication in am of sx with few sips of water , pt verbalized understanding

## 2020-02-13 NOTE — H&P PST ADULT - NEGATIVE MUSCULOSKELETAL SYMPTOMS
no joint swelling/no myalgia/no arthralgia/no arthritis/no muscle cramps/no muscle weakness/no neck pain/no back pain

## 2020-02-13 NOTE — H&P PST ADULT - NEGATIVE GASTROINTESTINAL SYMPTOMS
no constipation/no change in bowel habits/no flatulence/no nausea/no abdominal pain/no diarrhea/no vomiting

## 2020-02-13 NOTE — H&P PST ADULT - NEGATIVE OPHTHALMOLOGIC SYMPTOMS
no lacrimation R/no blurred vision L/no blurred vision R/no discharge R/no diplopia/no lacrimation L/no discharge L/no pain L/no irritation L/no photophobia/no pain R/no irritation R

## 2020-02-13 NOTE — H&P PST ADULT - NEGATIVE NEUROLOGICAL SYMPTOMS
no difficulty walking/no hemiparesis/no weakness/no generalized seizures/no focal seizures/no transient paralysis/no paresthesias/no syncope/no tremors/no headache/no facial palsy/no vertigo/no loss of sensation/no loss of consciousness/no confusion

## 2020-02-13 NOTE — H&P PST ADULT - NEGATIVE CARDIOVASCULAR SYMPTOMS
no palpitations/no orthopnea/no paroxysmal nocturnal dyspnea/no claudication/no dyspnea on exertion/no chest pain/no peripheral edema

## 2020-02-18 NOTE — ASU PATIENT PROFILE, ADULT - PMH
Anemia    Bronchitis, not specified as acute or chronic    Cough  chronic  History of weight loss    Hypothyroidism    Leukocytosis    Nasal congestion    Neck swelling  right submandibular mass  Sialoadenitis    Sinus congestion    Type 2 diabetes mellitus

## 2020-02-19 ENCOUNTER — APPOINTMENT (OUTPATIENT)
Dept: PULMONOLOGY | Facility: HOSPITAL | Age: 65
End: 2020-02-19

## 2020-02-19 ENCOUNTER — RESULT REVIEW (OUTPATIENT)
Age: 65
End: 2020-02-19

## 2020-02-19 ENCOUNTER — OUTPATIENT (OUTPATIENT)
Dept: OUTPATIENT SERVICES | Facility: HOSPITAL | Age: 65
LOS: 1 days | Discharge: HOME | End: 2020-02-19
Payer: MEDICAID

## 2020-02-19 VITALS
SYSTOLIC BLOOD PRESSURE: 88 MMHG | HEIGHT: 60 IN | RESPIRATION RATE: 13 BRPM | DIASTOLIC BLOOD PRESSURE: 54 MMHG | TEMPERATURE: 98 F | HEART RATE: 88 BPM | WEIGHT: 95.9 LBS | OXYGEN SATURATION: 96 %

## 2020-02-19 VITALS
HEART RATE: 90 BPM | DIASTOLIC BLOOD PRESSURE: 52 MMHG | SYSTOLIC BLOOD PRESSURE: 92 MMHG | TEMPERATURE: 98 F | RESPIRATION RATE: 18 BRPM | OXYGEN SATURATION: 100 %

## 2020-02-19 DIAGNOSIS — E03.9 HYPOTHYROIDISM, UNSPECIFIED: ICD-10-CM

## 2020-02-19 DIAGNOSIS — J40 BRONCHITIS, NOT SPECIFIED AS ACUTE OR CHRONIC: ICD-10-CM

## 2020-02-19 DIAGNOSIS — Z98.890 OTHER SPECIFIED POSTPROCEDURAL STATES: Chronic | ICD-10-CM

## 2020-02-19 DIAGNOSIS — E11.9 TYPE 2 DIABETES MELLITUS WITHOUT COMPLICATIONS: ICD-10-CM

## 2020-02-19 LAB
BODY FLUID TYPE: SIGNIFICANT CHANGE UP
CLARITY SPEC: SIGNIFICANT CHANGE UP
COLOR FLD: SIGNIFICANT CHANGE UP
COMMENT - FLUIDS: SIGNIFICANT CHANGE UP
EOSINOPHIL # FLD: 3 % — SIGNIFICANT CHANGE UP
GRAM STN SPT: SIGNIFICANT CHANGE UP
MACROPHAGES # FLD: 17 % — SIGNIFICANT CHANGE UP
MONOCYTES # FLD: 3 % — SIGNIFICANT CHANGE UP
NEUTS SEG NFR FLD MANUAL: 77 % — SIGNIFICANT CHANGE UP
RCV VOL RI: SIGNIFICANT CHANGE UP CELL/UL (ref 0–5)
SPECIMEN SOURCE: SIGNIFICANT CHANGE UP
TOTAL CELLS COUNTED, BODY FLUID: 100 CELLS — SIGNIFICANT CHANGE UP
TOTAL NUCLEATED CELL COUNT, BODY FLUID: SIGNIFICANT CHANGE UP CELL/UL (ref 0–5)

## 2020-02-19 PROCEDURE — 88305 TISSUE EXAM BY PATHOLOGIST: CPT | Mod: 26,59

## 2020-02-19 PROCEDURE — 88112 CYTOPATH CELL ENHANCE TECH: CPT | Mod: 26

## 2020-02-19 PROCEDURE — 88312 SPECIAL STAINS GROUP 1: CPT | Mod: 26

## 2020-02-19 PROCEDURE — 88305 TISSUE EXAM BY PATHOLOGIST: CPT | Mod: 26

## 2020-02-19 RX ORDER — FENTANYL CITRATE 50 UG/ML
25 INJECTION INTRAVENOUS
Refills: 0 | Status: DISCONTINUED | OUTPATIENT
Start: 2020-02-19 | End: 2020-02-19

## 2020-02-19 RX ORDER — ALBUTEROL 90 UG/1
2 AEROSOL, METERED ORAL
Qty: 0 | Refills: 0 | DISCHARGE

## 2020-02-19 RX ORDER — MONTELUKAST 4 MG/1
1 TABLET, CHEWABLE ORAL
Qty: 0 | Refills: 0 | DISCHARGE

## 2020-02-19 RX ORDER — LEVOTHYROXINE SODIUM 125 MCG
1 TABLET ORAL
Qty: 0 | Refills: 0 | DISCHARGE

## 2020-02-19 RX ORDER — IPRATROPIUM/ALBUTEROL SULFATE 18-103MCG
3 AEROSOL WITH ADAPTER (GRAM) INHALATION ONCE
Refills: 0 | Status: DISCONTINUED | OUTPATIENT
Start: 2020-02-19 | End: 2020-02-19

## 2020-02-19 RX ORDER — BUDESONIDE, MICRONIZED 100 %
1 POWDER (GRAM) MISCELLANEOUS
Qty: 0 | Refills: 0 | DISCHARGE

## 2020-02-19 RX ORDER — ONDANSETRON 8 MG/1
4 TABLET, FILM COATED ORAL ONCE
Refills: 0 | Status: DISCONTINUED | OUTPATIENT
Start: 2020-02-19 | End: 2020-02-19

## 2020-02-19 RX ORDER — FENTANYL CITRATE 50 UG/ML
50 INJECTION INTRAVENOUS ONCE
Refills: 0 | Status: DISCONTINUED | OUTPATIENT
Start: 2020-02-19 | End: 2020-02-19

## 2020-02-19 NOTE — ASU DISCHARGE PLAN (ADULT/PEDIATRIC) - CARE PROVIDER_API CALL
Babita Handy)  Critical Care Medicine; Internal Medicine; Pulmonary Disease; Sleep Medicine  [address] [place] [address]  Phone: [phone]  Fax: [phone]  Follow Up Time:

## 2020-02-19 NOTE — ASU DISCHARGE PLAN (ADULT/PEDIATRIC) - ASU DC SPECIAL INSTRUCTIONSFT
1) Today you underwent a diagnostic bronchoscopy. Please follow-up with your pulmonologist within 1-2 weeks for further management and to go over the results of your test today.    2) You may experience cough or mucous production, throat irritation, small amounts of bleeding, or low-grade fevers for 1-2 days following your procedure. If you experience shortness of breath, chest pain or pressure, lightheadedness, large amount of bleeding, or any other symptoms that are concerning to you please seek medical attention right away.

## 2020-02-20 LAB
CULTURE - ACID FAST SMEAR CONCENTRATED: SIGNIFICANT CHANGE UP
SPECIMEN SOURCE: SIGNIFICANT CHANGE UP

## 2020-02-24 ENCOUNTER — APPOINTMENT (OUTPATIENT)
Dept: PULMONOLOGY | Facility: CLINIC | Age: 65
End: 2020-02-24
Payer: MEDICAID

## 2020-02-24 VITALS
RESPIRATION RATE: 15 BRPM | HEIGHT: 65 IN | HEART RATE: 91 BPM | BODY MASS INDEX: 15.99 KG/M2 | OXYGEN SATURATION: 94 % | DIASTOLIC BLOOD PRESSURE: 66 MMHG | TEMPERATURE: 98 F | SYSTOLIC BLOOD PRESSURE: 100 MMHG | WEIGHT: 96 LBS

## 2020-02-24 LAB — BACTERIA SPT RESP CULT: SIGNIFICANT CHANGE UP

## 2020-02-24 PROCEDURE — 99214 OFFICE O/P EST MOD 30 MIN: CPT

## 2020-02-24 NOTE — PHYSICAL EXAM
[No Acute Distress] : no acute distress [Normal Oropharynx] : normal oropharynx [No Deformities] : no deformities [II] : Mallampati Class: II [Erythema] : erythema [Normal Rate/Rhythm] : normal rate/rhythm [Normal Appearance] : normal appearance [Normal S1, S2] : normal s1, s2 [No Murmurs] : no murmurs [No Resp Distress] : no resp distress [Clear to Auscultation Bilaterally] : clear to auscultation bilaterally [No Abnormalities] : no abnormalities [Benign] : benign [No Clubbing] : no clubbing [Normal Gait] : normal gait [No Cyanosis] : no cyanosis [No Edema] : no edema [Normal Color/ Pigmentation] : normal color/ pigmentation [Oriented x3] : oriented x3 [No Focal Deficits] : no focal deficits [Normal Affect] : normal affect

## 2020-02-25 PROBLEM — D72.829 ELEVATED WHITE BLOOD CELL COUNT, UNSPECIFIED: Chronic | Status: ACTIVE | Noted: 2019-10-17

## 2020-02-25 PROBLEM — J40 BRONCHITIS, NOT SPECIFIED AS ACUTE OR CHRONIC: Chronic | Status: ACTIVE | Noted: 2020-02-13

## 2020-02-25 PROBLEM — R05 COUGH: Chronic | Status: ACTIVE | Noted: 2019-10-17

## 2020-02-26 LAB — SPECIMEN SOURCE: SIGNIFICANT CHANGE UP

## 2020-02-28 NOTE — ASSESSMENT
[FreeTextEntry1] : 64 year old female with chronic cough with recent bronchoscopy showing diffuse lesions showing chronic inflammation, with biopsy negative for malignancy and organisms, and no significant eosinophils were noted. Viral culture is pending.  Will resume prednisone 40 mg and plan for longer course. Will start Bactrim for PCP ppx. Will plan for bronchoscopy while on steroids to see if these lesions in airway disappear. Continue Pulmicort BID, Singulair 10 mg daily and Ventolin prn. Follow up in 2 weeks.\par

## 2020-02-28 NOTE — REVIEW OF SYSTEMS
[Nasal Congestion] : nasal congestion [Sinus Problems] : sinus problems [Cough] : cough [Dyspnea] : dyspnea [SOB on Exertion] : sob on exertion [Thyroid Problem] : thyroid problem [Negative] : Psychiatric [Fever] : no fever [Chills] : no chills [Postnasal Drip] : no postnasal drip [Chest Tightness] : no chest tightness [Pleuritic Pain] : no pleuritic pain [Wheezing] : no wheezing

## 2020-02-28 NOTE — HISTORY OF PRESENT ILLNESS
[Never] : never [TextBox_4] : 64 year old female here for follow up of chronic cough.\par \par Had a bronchoscopy on 2/29 which showed diffuse vesicular appearing lesions throughout the airway. Pathology returned as chronic inflammation and no significant eosinophils were seen. Cytopathology was negative for malignancy. AFB and GMS were negative for organisms. BAL was negative or microorganisms. Viral cultures are pending. Patient reports continued cough and has trouble sleeping. She is using her Pulmicort and Singulair.\par \par Last visit, she reported:\par She reports resolution of cough with prednisone 40 x 7 days but cough recurred about 1 week after stopping. Feels sinus congestion is now worse and has nasal congestion. No significant sputum production - just occasional in AM. Denies fevers, chills. Remains on Pulmicort and Singulair.\par \par History:\par She developed persistent productive cough around October 2019. Prior to that, around June 2019 she developed persistent sinonasal symptoms that started with facial pain/pressure, rhinorrhea, headaches, and fever. She was given multiple courses of antibiotics including amoxicillin, Augmentin, levofloxacin as well as courses of prednisone with persistent swelling and symptoms including swelling in submandibular region with weight loss, fatigue, anorexia, and night sweats. Concerned for lymphoma vs. inflammatory etiology, has sinus biopsy which showed inflammation as well as a right submandibular gland biopsy which showed acute and chronic inflammation. Was given another course of prednisone taper with improvement in sinus inflammation but developed cough. Does not notice significant post-nasal drip any longer but cough is persistent productive of yellowish sputum. Cough is worse when lying down. Denies hemoptysis. Has some left rib pain related to coughing. Takes Robitussin, Mucinex, and Hycodan with little relief. \par \par \par valacyclovir\par

## 2020-03-03 LAB — SPECIMEN SOURCE: SIGNIFICANT CHANGE UP

## 2020-03-09 ENCOUNTER — APPOINTMENT (OUTPATIENT)
Dept: PULMONOLOGY | Facility: CLINIC | Age: 65
End: 2020-03-09
Payer: MEDICAID

## 2020-03-09 VITALS
RESPIRATION RATE: 15 BRPM | WEIGHT: 94 LBS | SYSTOLIC BLOOD PRESSURE: 92 MMHG | DIASTOLIC BLOOD PRESSURE: 60 MMHG | HEART RATE: 81 BPM | TEMPERATURE: 97.9 F | BODY MASS INDEX: 15.64 KG/M2 | OXYGEN SATURATION: 94 %

## 2020-03-09 PROCEDURE — 99214 OFFICE O/P EST MOD 30 MIN: CPT

## 2020-03-09 NOTE — HISTORY OF PRESENT ILLNESS
[Never] : never [TextBox_4] : 64 year old female here for follow up of chronic cough.\par \par Doing much better on prednisone 40 mg daily which she has been on for 2 weeks, along with Bactrim MWF. After 1 week on prednisone, coughing virtually dissipated. Initially coughed up sputum but no longer. Sinuses have cleared up. Voice has improved. Cervical lymphadenopathy also improved. Viral cultures from bronchoscopy still pending. Not currently taking any inhalers.\par \par History:\par She developed persistent productive cough around October 2019. Prior to that, around June 2019 she developed persistent sinonasal symptoms that started with facial pain/pressure, rhinorrhea, headaches, and fever. She was given multiple courses of antibiotics including amoxicillin, Augmentin, levofloxacin as well as courses of prednisone with persistent swelling and symptoms including swelling in submandibular region with weight loss, fatigue, anorexia, and night sweats. Concerned for lymphoma vs. inflammatory etiology, has sinus biopsy which showed inflammation as well as a right submandibular gland biopsy which showed acute and chronic inflammation. Was given another course of prednisone taper with improvement in sinus inflammation but developed cough. Does not notice significant post-nasal drip any longer but cough is persistent productive of yellowish sputum. Cough is worse when lying down. Denies hemoptysis. Has some left rib pain related to coughing. Takes Robitussin, Mucinex, and Hycodan with little relief. \par \par Resolution of cough with prednisone 40 x 7 days (started January 15, 2020) but cough recurred about 1 week after stopping. Sinus congestion is worse as well as the nasal congestion. No significant sputum production - just occasional in AM. Denies fevers, chills. Remained on Pulmicort and Singulair.\par \par Had bronchoscopy on 2/29/2020 which showed diffuse vesicular appearing lesions throughout the airway. Pathology returned as chronic inflammation and no significant eosinophils were seen. Cytopathology was negative for malignancy. AFB and GMS were negative for organisms. BAL was negative or microorganisms. Patient reported continued cough and has trouble sleeping.  Remained on Pulmicort and Singulair.\par \par

## 2020-03-09 NOTE — REVIEW OF SYSTEMS
[Thyroid Problem] : thyroid problem [Negative] : Psychiatric [Fever] : no fever [Chills] : no chills [Nasal Congestion] : no nasal congestion [Postnasal Drip] : no postnasal drip [Sinus Problems] : no sinus problems [Cough] : no cough [Chest Tightness] : no chest tightness [Dyspnea] : no dyspnea [Pleuritic Pain] : no pleuritic pain [Wheezing] : no wheezing [SOB on Exertion] : no sob on exertion [Diabetes] : diabetes

## 2020-03-09 NOTE — PHYSICAL EXAM
[No Acute Distress] : no acute distress [No Deformities] : no deformities [Normal Oropharynx] : normal oropharynx [Erythema] : erythema [II] : Mallampati Class: II [Normal Appearance] : normal appearance [Normal Rate/Rhythm] : normal rate/rhythm [Normal S1, S2] : normal s1, s2 [No Murmurs] : no murmurs [No Resp Distress] : no resp distress [Clear to Auscultation Bilaterally] : clear to auscultation bilaterally [No Abnormalities] : no abnormalities [Benign] : benign [Normal Gait] : normal gait [No Clubbing] : no clubbing [No Cyanosis] : no cyanosis [No Edema] : no edema [Normal Color/ Pigmentation] : normal color/ pigmentation [No Focal Deficits] : no focal deficits [Oriented x3] : oriented x3 [Normal Affect] : normal affect

## 2020-03-09 NOTE — ASSESSMENT
[FreeTextEntry1] : 64 year old female with chronic cough with recent bronchoscopy showing diffuse lesions showing chronic inflammation, with biopsy negative for malignancy and organisms, and no significant eosinophils were noted. Viral culture still pending.  Symptoms resolved with prednisone 40 mg which she has now been on for two weeks. Will schedule repeat bronchoscopy to see if lesions have improved. Continue current dose of prednisone for now. Continue Bactrim for PCP ppx.

## 2020-03-18 ENCOUNTER — APPOINTMENT (OUTPATIENT)
Dept: PULMONOLOGY | Facility: HOSPITAL | Age: 65
End: 2020-03-18

## 2020-03-18 ENCOUNTER — RX RENEWAL (OUTPATIENT)
Age: 65
End: 2020-03-18

## 2020-03-21 LAB — FUNGUS SPEC QL CULT: SIGNIFICANT CHANGE UP

## 2020-04-01 LAB
ACID FAST STN SPEC: SIGNIFICANT CHANGE UP
SPECIMEN SOURCE: SIGNIFICANT CHANGE UP
SPECIMEN SOURCE: SIGNIFICANT CHANGE UP

## 2020-06-11 ENCOUNTER — APPOINTMENT (OUTPATIENT)
Dept: OTOLARYNGOLOGY | Facility: CLINIC | Age: 65
End: 2020-06-11
Payer: MEDICAID

## 2020-06-11 VITALS
HEART RATE: 111 BPM | WEIGHT: 94 LBS | SYSTOLIC BLOOD PRESSURE: 116 MMHG | HEIGHT: 65 IN | DIASTOLIC BLOOD PRESSURE: 74 MMHG | BODY MASS INDEX: 15.66 KG/M2

## 2020-06-11 PROCEDURE — 99214 OFFICE O/P EST MOD 30 MIN: CPT | Mod: 25

## 2020-06-11 PROCEDURE — 31233 NSL/SINS NDSC DX MAX SINUSC: CPT

## 2020-06-11 NOTE — REASON FOR VISIT
[Subsequent Evaluation] : a subsequent evaluation for [[employer] ] : provided by [employer]   [FreeTextEntry2] : tammy [FreeTextEntry1] : [id number] [TWNoteComboBox1] : Kinyarwanda

## 2020-06-11 NOTE — HISTORY OF PRESENT ILLNESS
[de-identified] : Patient referred by Dr Mcdermott for submandibular mass. Patient started her symptoms in the end of June 2019. Patient had frontal headaches, bilateral eye pressure and neck tenderness. Shortly after that she started having nasal obstruction. On 7/24/19 she had sinus CT. Was started on a few courses of Abx, last one was Levo, which she has been taking at this time.\par Had an open biopsy which just showed acute and chronic inflammation. Patient has improvement the nasal obstruction, neck swelling, weakness and night sweats.\par pt was told to use nasal spray and sinus rinse, flonase and flovent.\par No nasal obstruction, sneezing, itchy watery eyes or nasal drainage.\par pt is being f/u by rheum and pulm.\par per pt the submandibular glans is smaller and still palp, no pain or wt loss\par pt c/o still nasal mucus discharge, tongue discomfort,  chin area swelling and submandibular gland area on and off discomfort  since  last visit. \par pt completed steroid treatment.\par Complete review of systems which was performed during a previous encounter was reviewed with the patient and there are no changes except as stated in the HPI section.\par \par

## 2020-06-11 NOTE — PHYSICAL EXAM
[Midline] : trachea located in midline position [Normal] : no rashes [de-identified] : Congested [de-identified] : Incision well healed [de-identified] : Presence of erythema and edema of the R lower lid.

## 2020-06-11 NOTE — CONSULT LETTER
[Courtesy Letter:] : I had the pleasure of seeing your patient, [unfilled], in my office today. [Dear  ___] : Dear  [unfilled], [Sincerely,] : Sincerely, [Please see my note below.] : Please see my note below. [Consult Closing:] : Thank you very much for allowing me to participate in the care of this patient.  If you have any questions, please do not hesitate to contact me. [FreeTextEntry2] : Dr Douglas Mcdermott  [FreeTextEntry3] : \par Jordi Fuentes MD, FACS\par \par Otolaryngology-Head and Neck Surgery\par [employer] [facility]\par

## 2020-06-15 ENCOUNTER — APPOINTMENT (OUTPATIENT)
Dept: PULMONOLOGY | Facility: CLINIC | Age: 65
End: 2020-06-15
Payer: MEDICAID

## 2020-06-15 PROCEDURE — 99214 OFFICE O/P EST MOD 30 MIN: CPT | Mod: 95

## 2020-06-15 RX ORDER — BUDESONIDE 180 UG/1
180 AEROSOL, POWDER RESPIRATORY (INHALATION)
Qty: 1 | Refills: 2 | Status: DISCONTINUED | COMMUNITY
Start: 2019-12-10 | End: 2020-06-15

## 2020-06-15 RX ORDER — PREDNISONE 5 MG/1
5 TABLET ORAL
Qty: 42 | Refills: 0 | Status: DISCONTINUED | COMMUNITY
Start: 2020-04-14 | End: 2020-06-15

## 2020-06-15 RX ORDER — SULFAMETHOXAZOLE AND TRIMETHOPRIM 800; 160 MG/1; MG/1
800-160 TABLET ORAL DAILY
Qty: 30 | Refills: 3 | Status: DISCONTINUED | COMMUNITY
Start: 2020-02-24 | End: 2020-06-15

## 2020-06-15 RX ORDER — PREDNISONE 20 MG/1
20 TABLET ORAL
Qty: 100 | Refills: 1 | Status: DISCONTINUED | COMMUNITY
Start: 2020-02-24 | End: 2020-06-15

## 2020-06-15 RX ORDER — PREDNISONE 20 MG/1
20 TABLET ORAL
Qty: 28 | Refills: 0 | Status: DISCONTINUED | COMMUNITY
Start: 2019-10-23 | End: 2020-06-15

## 2020-06-15 NOTE — REVIEW OF SYSTEMS
[Diabetes] : diabetes [Thyroid Problem] : thyroid problem [Negative] : Psychiatric [Fever] : no fever [Chills] : no chills [Nasal Congestion] : no nasal congestion [Postnasal Drip] : no postnasal drip [Cough] : no cough [Sinus Problems] : no sinus problems [Dyspnea] : no dyspnea [Pleuritic Pain] : no pleuritic pain [Chest Tightness] : no chest tightness [Wheezing] : no wheezing [SOB on Exertion] : no sob on exertion

## 2020-06-15 NOTE — HISTORY OF PRESENT ILLNESS
[Home] : at home, [unfilled] , at the time of the visit. [Medical Office: ([facility])___] : at the medical office located in  [Verbal consent obtained from patient] : the patient, [unfilled] [Never] : never [TextBox_4] : 64 year old female here for follow up of chronic cough and airway inflammation via telehealth due to COVID19 pandemic.\par \par Completed course of prednisone mid-May. She began getting a mild sore throat, and pain behind ears particularly when swallowing since beginning of June. She also again has difficulty with taste and sense of smell. Resumed sinus rinses and nasal spray. Not yet coughing again - does not feel that the "inflammation is back in her lung". She is not taking the Pulmicort inhaler. Was scheduled for repeat bronchoscopy while on steroids in March but cancelled due to pandemic.\par \par History:\par She developed persistent productive cough around October 2019. Prior to that, around June 2019 she developed persistent sinonasal symptoms that started with facial pain/pressure, rhinorrhea, headaches, and fever. She was given multiple courses of antibiotics including amoxicillin, Augmentin, levofloxacin as well as courses of prednisone with persistent swelling and symptoms including swelling in submandibular region with weight loss, fatigue, anorexia, and night sweats. Concerned for lymphoma vs. inflammatory etiology, has sinus biopsy which showed inflammation as well as a right submandibular gland biopsy which showed acute and chronic inflammation. Was given another course of prednisone taper with improvement in sinus inflammation but developed cough. Does not notice significant post-nasal drip any longer but cough is persistent productive of yellowish sputum. Cough is worse when lying down. Denies hemoptysis. Has some left rib pain related to coughing. Takes Robitussin, Mucinex, and Hycodan with little relief. \par \par Resolution of cough with prednisone 40 x 7 days (started January 15, 2020) but cough recurred about 1 week after stopping. \par \par Had bronchoscopy on 2/19/2020 which showed diffuse vesicular appearing lesions throughout the airway. Pathology returned as chronic inflammation and no significant eosinophils were seen. Cytopathology was negative for malignancy.  AFB and GMS were negative for organisms. BAL was negative or microorganisms, including viral. Had extended course of Prednisone 40 mg started on 2/24/2020 and tapered off mid-May. \par \par

## 2020-06-15 NOTE — ASSESSMENT
[FreeTextEntry1] : 64 year old female with chronic cough with recent bronchoscopy showing diffuse lesions showing chronic inflammation, with biopsy negative for malignancy and organisms, and no significant eosinophils were noted. Viral culture also negative.  Symptoms resolved extended taper of steroids but upper airway symptoms, including nasal congestion and ear and throat pain with swallowing recurred 2 weeks after stopping steroids. Pulmonary symptoms, including cough, have not recurred. Would like to reschedule bronchoscopy to see if lesions have improved as the March bronchoscopy was cancelled due to pandemic. As symptoms are mild and patient is hesitant due to COVID10 concerns, will monitor for now. Follow up in 1 month.

## 2020-06-21 ENCOUNTER — RX RENEWAL (OUTPATIENT)
Age: 65
End: 2020-06-21

## 2020-06-23 ENCOUNTER — RX RENEWAL (OUTPATIENT)
Age: 65
End: 2020-06-23

## 2020-06-23 RX ORDER — FLUTICASONE PROPIONATE 110 UG/1
110 AEROSOL, METERED RESPIRATORY (INHALATION)
Qty: 12 | Refills: 2 | Status: ACTIVE | COMMUNITY
Start: 2019-12-11 | End: 1900-01-01

## 2020-07-20 RX ORDER — FLUTICASONE FUROATE 27.5 UG/1
27.5 SPRAY, METERED NASAL DAILY
Qty: 30 | Refills: 0 | Status: ACTIVE | COMMUNITY
Start: 2020-07-20 | End: 1900-01-01

## 2020-07-22 ENCOUNTER — RX RENEWAL (OUTPATIENT)
Age: 65
End: 2020-07-22

## 2020-08-10 ENCOUNTER — APPOINTMENT (OUTPATIENT)
Dept: PULMONOLOGY | Facility: CLINIC | Age: 65
End: 2020-08-10
Payer: MEDICAID

## 2020-08-10 PROCEDURE — 99214 OFFICE O/P EST MOD 30 MIN: CPT | Mod: 95

## 2020-08-10 NOTE — ASSESSMENT
[FreeTextEntry1] : 64 year old female with chronic cough with recent bronchoscopy showing diffuse lesions showing chronic inflammation, with biopsy negative for malignancy and organisms, and no significant eosinophils were noted. Viral culture also negative. She has had biopsy if sinus biopsy and right submandibular gland biopsy which all showed chronic inflammation as well. Symptoms resolved extended taper of steroids but upper airway symptoms, including nasal congestion and ear and throat pain with swallowing recurred 2 weeks after stopping steroids. Pulmonary symptoms have now recurred. She has had a recent visit with new ENT for second opinion who advised surgery for chronic sinusitis. Given the systemic nature of her disease process, I am concerned that surgery will not yield benefit. I will resume prednisone 20 mg and taper to lowest dose possible that yields resolution of symptoms. I would like her to go back to rheumatology for evaluation. Will reschedule bronchoscopy to assess if lesions improve with steroids.\par \par Follow up in 2 weeks.

## 2020-08-10 NOTE — HISTORY OF PRESENT ILLNESS
[Never] : never [Home] : at home, [unfilled] , at the time of the visit. [Medical Office: ([facility])___] : at the medical office located in  [Verbal consent obtained from patient] : the patient, [unfilled] [TextBox_4] : 64 year old female here for follow up of chronic cough and airway inflammation via telehealth due to COVID19 pandemic.\par \par Completed course of prednisone mid-May. She began getting a mild sore throat, and pain behind ears particularly when swallowing since beginning of June. She also again has difficulty with taste and sense of smell. Resumed sinus rinses and nasal spray. Now has cough. She went for second opinion ENT who advised surgery for sinusitis.\par \par History:\par She developed persistent productive cough around October 2019. Prior to that, around June 2019 she developed persistent sinonasal symptoms that started with facial pain/pressure, rhinorrhea, headaches, and fever. She was given multiple courses of antibiotics including amoxicillin, Augmentin, levofloxacin as well as courses of prednisone with persistent swelling and symptoms including swelling in submandibular region with weight loss, fatigue, anorexia, and night sweats. Concerned for lymphoma vs. inflammatory etiology, has sinus biopsy which showed inflammation as well as a right submandibular gland biopsy which showed acute and chronic inflammation. Was given another course of prednisone taper with improvement in sinus inflammation but developed cough. Does not notice significant post-nasal drip any longer but cough is persistent productive of yellowish sputum. Cough is worse when lying down. Denies hemoptysis. Has some left rib pain related to coughing. Takes Robitussin, Mucinex, and Hycodan with little relief. \par \par Resolution of cough with prednisone 40 x 7 days (started January 15, 2020) but cough recurred about 1 week after stopping. \par \par Had bronchoscopy on 2/19/2020 which showed diffuse vesicular appearing lesions throughout the airway. Pathology returned as chronic inflammation and no significant eosinophils were seen. Cytopathology was negative for malignancy.  AFB and GMS were negative for organisms. BAL was negative or microorganisms, including viral. Had extended course of Prednisone 40 mg started on 2/24/2020 and tapered off mid-May. \par \par

## 2020-08-10 NOTE — REVIEW OF SYSTEMS
[Diabetes] : diabetes [Thyroid Problem] : thyroid problem [Negative] : Hematologic [Fever] : no fever [Chills] : no chills [Nasal Congestion] : nasal congestion [Postnasal Drip] : postnasal drip [Sinus Problems] : sinus problems [Chest Tightness] : no chest tightness [Cough] : cough [Dyspnea] : no dyspnea [Pleuritic Pain] : no pleuritic pain [Wheezing] : no wheezing [SOB on Exertion] : no sob on exertion

## 2020-08-23 ENCOUNTER — RX RENEWAL (OUTPATIENT)
Age: 65
End: 2020-08-23

## 2020-09-22 ENCOUNTER — APPOINTMENT (OUTPATIENT)
Dept: INTERNAL MEDICINE | Facility: CLINIC | Age: 65
End: 2020-09-22
Payer: MEDICARE

## 2020-09-22 VITALS
DIASTOLIC BLOOD PRESSURE: 70 MMHG | BODY MASS INDEX: 16.33 KG/M2 | OXYGEN SATURATION: 98 % | HEART RATE: 96 BPM | WEIGHT: 98 LBS | SYSTOLIC BLOOD PRESSURE: 110 MMHG | TEMPERATURE: 96.6 F | HEIGHT: 65 IN

## 2020-09-22 PROCEDURE — 99214 OFFICE O/P EST MOD 30 MIN: CPT | Mod: 25

## 2020-09-22 PROCEDURE — 93000 ELECTROCARDIOGRAM COMPLETE: CPT

## 2020-09-22 PROCEDURE — 36415 COLL VENOUS BLD VENIPUNCTURE: CPT

## 2020-09-22 NOTE — HISTORY OF PRESENT ILLNESS
[FreeTextEntry1] : pt is here for medical clearance.  [de-identified] : pt is going for sinus surgery on 10/6 at [facility] clinic and covid test PST on 102/20. pt has had chronic cough, nasal discharge with Dx of chronic sinusitis S/P bronchoscopy, now on prednisone taper on 2.5 mg daily to start 1 mg daily until 10/2/2020. pt is now doing well without any acute symptoms. not really coughing or producing sputum. No fever, N/V/D.

## 2020-09-22 NOTE — PHYSICAL EXAM
[No Acute Distress] : no acute distress [Well-Appearing] : well-appearing [Supple] : supple [Thyroid Normal, No Nodules] : the thyroid was normal and there were no nodules present [No Respiratory Distress] : no respiratory distress  [No Accessory Muscle Use] : no accessory muscle use [Clear to Auscultation] : lungs were clear to auscultation bilaterally [Normal Rate] : normal rate  [Regular Rhythm] : with a regular rhythm [Normal S1, S2] : normal S1 and S2 [No Edema] : there was no peripheral edema [Soft] : abdomen soft [Non Tender] : non-tender [Non-distended] : non-distended [Grossly Normal Strength/Tone] : grossly normal strength/tone [Normal Gait] : normal gait [Normal Affect] : the affect was normal [Alert and Oriented x3] : oriented to person, place, and time [Normal Insight/Judgement] : insight and judgment were intact [Normal Outer Ear/Nose] : the outer ears and nose were normal in appearance [Normal TMs] : both tympanic membranes were normal [No Lymphadenopathy] : no lymphadenopathy

## 2020-09-22 NOTE — PLAN
[FreeTextEntry1] : \par chronic sinusitis cont' prednisone taper at this time, prednisone 1mg daily until 10/2/2020 Rx'd as per ENT recommendation\par pt to go for sinus surgery under [facility], medical clearance will be completed after lab result\par EKG normal today\par pt received flu shot in 9/2020\par DM hbA1c 6.4 on 8/26/2020 at [facility] endocrinology cont' metformin 1g bid\par hypothyroidism TFT normal on 8/26/2020 cont' current synthroid 25mcg-50mcg aon alternate day\par RTC physical exam as scheduled a

## 2020-09-25 LAB
ALBUMIN SERPL ELPH-MCNC: 5.1 G/DL
ALP BLD-CCNC: 59 U/L
ALT SERPL-CCNC: 17 U/L
ANION GAP SERPL CALC-SCNC: 17 MMOL/L
AST SERPL-CCNC: 19 U/L
BASOPHILS # BLD AUTO: 0.13 K/UL
BASOPHILS NFR BLD AUTO: 0.8 %
BILIRUB SERPL-MCNC: <0.2 MG/DL
BUN SERPL-MCNC: 21 MG/DL
CALCIUM SERPL-MCNC: 10.8 MG/DL
CHLORIDE SERPL-SCNC: 96 MMOL/L
CO2 SERPL-SCNC: 27 MMOL/L
CREAT SERPL-MCNC: 0.61 MG/DL
EOSINOPHIL # BLD AUTO: 0.22 K/UL
EOSINOPHIL NFR BLD AUTO: 1.3 %
ESTIMATED AVERAGE GLUCOSE: 143 MG/DL
GLUCOSE SERPL-MCNC: 176 MG/DL
HBA1C MFR BLD HPLC: 6.6 %
HCT VFR BLD CALC: 43 %
HGB BLD-MCNC: 13.4 G/DL
IMM GRANULOCYTES NFR BLD AUTO: 0.3 %
LYMPHOCYTES # BLD AUTO: 2.58 K/UL
LYMPHOCYTES NFR BLD AUTO: 14.9 %
MAN DIFF?: NORMAL
MCHC RBC-ENTMCNC: 31.2 GM/DL
MCHC RBC-ENTMCNC: 31.6 PG
MCV RBC AUTO: 101.4 FL
MONOCYTES # BLD AUTO: 0.81 K/UL
MONOCYTES NFR BLD AUTO: 4.7 %
NEUTROPHILS # BLD AUTO: 13.49 K/UL
NEUTROPHILS NFR BLD AUTO: 78 %
PLATELET # BLD AUTO: 490 K/UL
POTASSIUM SERPL-SCNC: 4.5 MMOL/L
PROT SERPL-MCNC: 7.9 G/DL
RBC # BLD: 4.24 M/UL
RBC # FLD: 13.1 %
SODIUM SERPL-SCNC: 141 MMOL/L
WBC # FLD AUTO: 17.29 K/UL

## 2020-10-26 ENCOUNTER — APPOINTMENT (OUTPATIENT)
Dept: INTERNAL MEDICINE | Facility: CLINIC | Age: 65
End: 2020-10-26
Payer: MEDICARE

## 2020-10-26 VITALS
HEIGHT: 61 IN | DIASTOLIC BLOOD PRESSURE: 60 MMHG | SYSTOLIC BLOOD PRESSURE: 100 MMHG | WEIGHT: 92 LBS | BODY MASS INDEX: 17.37 KG/M2

## 2020-10-26 PROCEDURE — 99214 OFFICE O/P EST MOD 30 MIN: CPT | Mod: 25

## 2020-10-26 PROCEDURE — 90670 PCV13 VACCINE IM: CPT

## 2020-10-26 PROCEDURE — 36415 COLL VENOUS BLD VENIPUNCTURE: CPT

## 2020-10-26 PROCEDURE — 99072 ADDL SUPL MATRL&STAF TM PHE: CPT

## 2020-10-26 PROCEDURE — G0009: CPT

## 2020-10-26 NOTE — PHYSICAL EXAM
[No Acute Distress] : no acute distress [Well Nourished] : well nourished [Well Developed] : well developed [Well-Appearing] : well-appearing [Normal Outer Ear/Nose] : the outer ears and nose were normal in appearance [Supple] : supple [No Respiratory Distress] : no respiratory distress  [No Accessory Muscle Use] : no accessory muscle use [Clear to Auscultation] : lungs were clear to auscultation bilaterally [Normal Rate] : normal rate  [Regular Rhythm] : with a regular rhythm [No Edema] : there was no peripheral edema [Soft] : abdomen soft [Non Tender] : non-tender [No CVA Tenderness] : no CVA  tenderness [No Spinal Tenderness] : no spinal tenderness [Cyanosis] : no cyanosis [Abnormal Temperature] : normal temperature [Coordination Grossly Intact] : coordination grossly intact [Normal Gait] : normal gait [Speech Grossly Normal] : speech grossly normal [Normal Affect] : the affect was normal [Normal Mood] : the mood was normal [Right Foot Was Examined] : Right foot ~C was examined [Left Foot Was Examined] : left foot ~C was examined [None] : no ulcers in either foot were found

## 2020-10-26 NOTE — ASSESSMENT
[FreeTextEntry1] : recurrent sinus grover-S/P sinus surg in 10/2020.  per ENT at [facility]\par Continue same  diabetes medication.  Hemoglobin A1C.  urine alb/creat\par Continue same thyroid medication.  TFT.\par CBC\par prevnar 13

## 2020-10-26 NOTE — HISTORY OF PRESENT ILLNESS
[FreeTextEntry1] : DM, elevated WBC, and hypothyroid [de-identified] : S/P sinus surg by [facility] ENT in 10/2020.\par No new complaints.\par

## 2020-10-29 LAB
ALBUMIN SERPL ELPH-MCNC: 4.3 G/DL
ALP BLD-CCNC: 57 U/L
ALT SERPL-CCNC: 10 U/L
ANION GAP SERPL CALC-SCNC: 10 MMOL/L
AST SERPL-CCNC: 15 U/L
BASOPHILS # BLD AUTO: 0.15 K/UL
BASOPHILS NFR BLD AUTO: 1.6 %
BILIRUB SERPL-MCNC: 0.3 MG/DL
BUN SERPL-MCNC: 17 MG/DL
CALCIUM SERPL-MCNC: 9.5 MG/DL
CHLORIDE SERPL-SCNC: 103 MMOL/L
CO2 SERPL-SCNC: 28 MMOL/L
CREAT SERPL-MCNC: 0.52 MG/DL
CREAT SPEC-SCNC: 46 MG/DL
EOSINOPHIL # BLD AUTO: 0.74 K/UL
EOSINOPHIL NFR BLD AUTO: 7.7 %
ESTIMATED AVERAGE GLUCOSE: 151 MG/DL
GLUCOSE SERPL-MCNC: 111 MG/DL
HBA1C MFR BLD HPLC: 6.9 %
HCT VFR BLD CALC: 36.3 %
HGB BLD-MCNC: 11.6 G/DL
IMM GRANULOCYTES NFR BLD AUTO: 0.3 %
LYMPHOCYTES # BLD AUTO: 2.61 K/UL
LYMPHOCYTES NFR BLD AUTO: 27 %
MAN DIFF?: NORMAL
MCHC RBC-ENTMCNC: 31.4 PG
MCHC RBC-ENTMCNC: 32 GM/DL
MCV RBC AUTO: 98.1 FL
MICROALBUMIN 24H UR DL<=1MG/L-MCNC: <1.2 MG/DL
MICROALBUMIN/CREAT 24H UR-RTO: NORMAL MG/G
MONOCYTES # BLD AUTO: 0.75 K/UL
MONOCYTES NFR BLD AUTO: 7.8 %
NEUTROPHILS # BLD AUTO: 5.39 K/UL
NEUTROPHILS NFR BLD AUTO: 55.6 %
PLATELET # BLD AUTO: 480 K/UL
POTASSIUM SERPL-SCNC: 4.4 MMOL/L
PROT SERPL-MCNC: 7.3 G/DL
RBC # BLD: 3.7 M/UL
RBC # FLD: 13.2 %
SODIUM SERPL-SCNC: 142 MMOL/L
T4 FREE SERPL-MCNC: 1.1 NG/DL
TSH SERPL-ACNC: 3.87 UIU/ML
WBC # FLD AUTO: 9.67 K/UL

## 2020-11-01 NOTE — ASU PATIENT PROFILE, ADULT - AS SC BRADEN ACTIVITY
Pt with hx of colitis complaining of diarrhea since last night. Pt denies abdominal pain. Pt on home 02 for COPD. (4) walks frequently

## 2020-12-02 NOTE — ED ADULT NURSE NOTE - NSFALLRSKASSESASSIST_ED_ALL_ED
no Hydroxyzine Pregnancy And Lactation Text: This medication is not safe during pregnancy and should not be taken. It is also excreted in breast milk and breast feeding isn't recommended.

## 2020-12-29 NOTE — H&P PST ADULT - OCCUPATION
Tremfya Counseling: I discussed with the patient the risks of guselkumab including but not limited to immunosuppression, serious infections, worsening of inflammatory bowel disease and drug reactions.  The patient understands that monitoring is required including a PPD at baseline and must alert us or the primary physician if symptoms of infection or other concerning signs are noted. retired

## 2021-02-24 ENCOUNTER — RX RENEWAL (OUTPATIENT)
Age: 66
End: 2021-02-24

## 2021-02-24 RX ORDER — BLOOD SUGAR DIAGNOSTIC
STRIP MISCELLANEOUS
Qty: 100 | Refills: 3 | Status: ACTIVE | COMMUNITY
Start: 2019-06-10 | End: 1900-01-01

## 2021-03-31 ENCOUNTER — LABORATORY RESULT (OUTPATIENT)
Age: 66
End: 2021-03-31

## 2021-03-31 ENCOUNTER — APPOINTMENT (OUTPATIENT)
Dept: INTERNAL MEDICINE | Facility: CLINIC | Age: 66
End: 2021-03-31
Payer: MEDICAID

## 2021-03-31 VITALS
SYSTOLIC BLOOD PRESSURE: 90 MMHG | WEIGHT: 90 LBS | BODY MASS INDEX: 16.99 KG/M2 | DIASTOLIC BLOOD PRESSURE: 60 MMHG | HEIGHT: 61 IN

## 2021-03-31 PROCEDURE — 99072 ADDL SUPL MATRL&STAF TM PHE: CPT

## 2021-03-31 PROCEDURE — 99397 PER PM REEVAL EST PAT 65+ YR: CPT | Mod: 25

## 2021-03-31 PROCEDURE — 93000 ELECTROCARDIOGRAM COMPLETE: CPT

## 2021-03-31 NOTE — ASSESSMENT
[FreeTextEntry1] : follow weight\par DM-cont metformin 500 1 qam and 2 qpm.  A1C\par Continue same thyroid medication.  TFT.\par CBC\par Blood was drawn at office today.\par DEXA

## 2021-03-31 NOTE — PHYSICAL EXAM
[No Acute Distress] : no acute distress [No Respiratory Distress] : no respiratory distress  [No Accessory Muscle Use] : no accessory muscle use [Clear to Auscultation] : lungs were clear to auscultation bilaterally [Normal Rate] : normal rate  [Regular Rhythm] : with a regular rhythm [No Edema] : there was no peripheral edema [Soft] : abdomen soft [Non Tender] : non-tender [Normal Posterior Cervical Nodes] : no posterior cervical lymphadenopathy [Normal Anterior Cervical Nodes] : no anterior cervical lymphadenopathy [Right Foot Was Examined] : Right foot ~C was examined [Left Foot Was Examined] : left foot ~C was examined [None] : no ulcers in either foot were found

## 2021-04-05 ENCOUNTER — NON-APPOINTMENT (OUTPATIENT)
Age: 66
End: 2021-04-05

## 2021-04-05 LAB
25(OH)D3 SERPL-MCNC: 51.7 NG/ML
ALBUMIN SERPL ELPH-MCNC: 4.4 G/DL
ALP BLD-CCNC: 54 U/L
ALT SERPL-CCNC: 16 U/L
ANION GAP SERPL CALC-SCNC: 14 MMOL/L
AST SERPL-CCNC: 21 U/L
BASOPHILS # BLD AUTO: 0.14 K/UL
BASOPHILS NFR BLD AUTO: 1.6 %
BILIRUB SERPL-MCNC: 0.4 MG/DL
BUN SERPL-MCNC: 21 MG/DL
CALCIUM SERPL-MCNC: 9.4 MG/DL
CHLORIDE SERPL-SCNC: 100 MMOL/L
CHOLEST SERPL-MCNC: 150 MG/DL
CO2 SERPL-SCNC: 24 MMOL/L
CREAT SERPL-MCNC: 0.64 MG/DL
EOSINOPHIL # BLD AUTO: 0.75 K/UL
EOSINOPHIL NFR BLD AUTO: 8.4 %
GLUCOSE SERPL-MCNC: 93 MG/DL
HCT VFR BLD CALC: 38.7 %
HDLC SERPL-MCNC: 43 MG/DL
HGB BLD-MCNC: 12.2 G/DL
IMM GRANULOCYTES NFR BLD AUTO: 0.1 %
LDLC SERPL CALC-MCNC: 91 MG/DL
LYMPHOCYTES # BLD AUTO: 2.77 K/UL
LYMPHOCYTES NFR BLD AUTO: 31.1 %
MAN DIFF?: NORMAL
MCHC RBC-ENTMCNC: 31.5 GM/DL
MCHC RBC-ENTMCNC: 31.7 PG
MCV RBC AUTO: 100.5 FL
MONOCYTES # BLD AUTO: 0.65 K/UL
MONOCYTES NFR BLD AUTO: 7.3 %
NEUTROPHILS # BLD AUTO: 4.59 K/UL
NEUTROPHILS NFR BLD AUTO: 51.5 %
NONHDLC SERPL-MCNC: 108 MG/DL
PLATELET # BLD AUTO: 402 K/UL
POTASSIUM SERPL-SCNC: 4.8 MMOL/L
PROT SERPL-MCNC: 7.5 G/DL
RBC # BLD: 3.85 M/UL
RBC # FLD: 13.4 %
SODIUM SERPL-SCNC: 137 MMOL/L
T4 FREE SERPL-MCNC: 1.4 NG/DL
TRIGL SERPL-MCNC: 81 MG/DL
TSH SERPL-ACNC: 4.58 UIU/ML
WBC # FLD AUTO: 8.91 K/UL

## 2021-04-12 ENCOUNTER — APPOINTMENT (OUTPATIENT)
Dept: INTERNAL MEDICINE | Facility: CLINIC | Age: 66
End: 2021-04-12
Payer: MEDICAID

## 2021-04-12 PROCEDURE — 36415 COLL VENOUS BLD VENIPUNCTURE: CPT

## 2021-04-12 PROCEDURE — 99072 ADDL SUPL MATRL&STAF TM PHE: CPT

## 2021-04-13 LAB
ESTIMATED AVERAGE GLUCOSE: 137 MG/DL
HBA1C MFR BLD HPLC: 6.4 %

## 2021-04-19 NOTE — H&P PST ADULT - BREASTS DETAILS
How Severe Is This Condition?: mild Additional History: The patient states his wife noticed the lesion about a month ago. normal shape

## 2021-06-24 ENCOUNTER — RX RENEWAL (OUTPATIENT)
Age: 66
End: 2021-06-24

## 2021-07-26 ENCOUNTER — RX RENEWAL (OUTPATIENT)
Age: 66
End: 2021-07-26

## 2021-08-11 ENCOUNTER — APPOINTMENT (OUTPATIENT)
Dept: INTERNAL MEDICINE | Facility: CLINIC | Age: 66
End: 2021-08-11
Payer: MEDICARE

## 2021-08-11 VITALS
BODY MASS INDEX: 17.94 KG/M2 | WEIGHT: 95 LBS | SYSTOLIC BLOOD PRESSURE: 94 MMHG | DIASTOLIC BLOOD PRESSURE: 58 MMHG | HEIGHT: 61 IN

## 2021-08-11 PROCEDURE — 36415 COLL VENOUS BLD VENIPUNCTURE: CPT

## 2021-08-11 PROCEDURE — 77080 DXA BONE DENSITY AXIAL: CPT

## 2021-08-11 PROCEDURE — 99214 OFFICE O/P EST MOD 30 MIN: CPT | Mod: 25

## 2021-08-11 NOTE — ASSESSMENT
[FreeTextEntry1] : DEXA-osteopenia\par Continue same  diabetes medication.  Hemoglobin A1C.  cont metformin 500 1 bid.  sees endo at [facility]\par Continue same thyroid medication.  TFT.\par CBC\par Blood was drawn at office today.\par \par

## 2021-08-16 LAB
ALBUMIN SERPL ELPH-MCNC: 4.4 G/DL
ALP BLD-CCNC: 55 U/L
ALT SERPL-CCNC: 17 U/L
ANION GAP SERPL CALC-SCNC: 7 MMOL/L
AST SERPL-CCNC: 20 U/L
BASOPHILS # BLD AUTO: 0.1 K/UL
BASOPHILS NFR BLD AUTO: 1.3 %
BILIRUB SERPL-MCNC: 0.2 MG/DL
BUN SERPL-MCNC: 24 MG/DL
CALCIUM SERPL-MCNC: 9.9 MG/DL
CHLORIDE SERPL-SCNC: 102 MMOL/L
CO2 SERPL-SCNC: 29 MMOL/L
CREAT SERPL-MCNC: 0.59 MG/DL
EOSINOPHIL # BLD AUTO: 0.32 K/UL
EOSINOPHIL NFR BLD AUTO: 4.2 %
ESTIMATED AVERAGE GLUCOSE: 143 MG/DL
GLUCOSE SERPL-MCNC: 114 MG/DL
HBA1C MFR BLD HPLC: 6.6 %
HCT VFR BLD CALC: 37.1 %
HGB BLD-MCNC: 12 G/DL
IMM GRANULOCYTES NFR BLD AUTO: 0.1 %
LYMPHOCYTES # BLD AUTO: 3.24 K/UL
LYMPHOCYTES NFR BLD AUTO: 42.9 %
MAN DIFF?: NORMAL
MCHC RBC-ENTMCNC: 31.8 PG
MCHC RBC-ENTMCNC: 32.3 GM/DL
MCV RBC AUTO: 98.4 FL
MONOCYTES # BLD AUTO: 0.63 K/UL
MONOCYTES NFR BLD AUTO: 8.3 %
NEUTROPHILS # BLD AUTO: 3.25 K/UL
NEUTROPHILS NFR BLD AUTO: 43.2 %
PLATELET # BLD AUTO: 368 K/UL
POTASSIUM SERPL-SCNC: 4.8 MMOL/L
PROT SERPL-MCNC: 7.3 G/DL
RBC # BLD: 3.77 M/UL
RBC # FLD: 12.2 %
SODIUM SERPL-SCNC: 138 MMOL/L
T4 FREE SERPL-MCNC: 1.2 NG/DL
TSH SERPL-ACNC: 3.33 UIU/ML
WBC # FLD AUTO: 7.55 K/UL

## 2021-08-24 NOTE — H&P PST ADULT - NS MD HP HEP C STATUS
Problem: Pain  Goal: #Acceptable pain level achieved/maintained at rest using NRS/Faces  Description: This goal is used for patients who can self-report.  Acceptable means the level is at or below the identified comfort/function goal.  Outcome: Outcome Met, Continue evaluating goal progress toward completion  Goal: # Acceptable pain level achieved/maintained at rest using NRS/Faces without oversedation (opioid naive or PCA/Epidural infusion)  Description: This goal is used if Opioid-naïve or on PCA/Epidural Infusion.  Outcome: Outcome Met, Continue evaluating goal progress toward completion  Goal: # Verbalizes understanding of pain management  Description: Documented in Patient Education Activity  Outcome: Outcome Met, Continue evaluating goal progress toward completion      Unknown

## 2021-08-27 NOTE — ASU PREOP CHECKLIST - ALLERGY BAND ON
no known allergies
Ear pain with evidence of AOM on exam.  + fever + lymphadenopathy. No other viral symptoms.      Will treat for AOM.  Ibuprofen and amoxicillin prescribed.

## 2021-09-24 ENCOUNTER — RX RENEWAL (OUTPATIENT)
Age: 66
End: 2021-09-24

## 2021-12-18 NOTE — PHYSICAL EXAM
OB US for growth at [facility] scheduled 1/17/2021  OB US done 12/17/2021 still low lying   Return in 2 weeks      [General Appearance - Well Developed] : well developed [General Appearance - Well Nourished] : well nourished [Normal Appearance] : normal appearance [Well Groomed] : well groomed [No Deformities] : no deformities [General Appearance - In No Acute Distress] : no acute distress [Normal Conjunctiva] : the conjunctiva exhibited no abnormalities [Erythema] : erythema of the pharynx [II] : II [Neck Appearance] : the appearance of the neck was normal [Heart Rate And Rhythm] : heart rate and rhythm were normal [Heart Sounds] : normal S1 and S2 [Murmurs] : no murmurs present [Arterial Pulses Normal] : the arterial pulses were normal [Respiration, Rhythm And Depth] : normal respiratory rhythm and effort [Exaggerated Use Of Accessory Muscles For Inspiration] : no accessory muscle use [Auscultation Breath Sounds / Voice Sounds] : lungs were clear to auscultation bilaterally [Nail Clubbing] : no clubbing of the fingernails [Abnormal Walk] : normal gait [Nail Splinter Hemorrhages] : no splinter hemorrhages of the nails [Cyanosis, Localized] : no localized cyanosis [Petechial Hemorrhages (___cm)] : no petechial hemorrhages [No Focal Deficits] : no focal deficits [Oriented To Time, Place, And Person] : oriented to person, place, and time [Mood] : the mood was normal [Affect] : the affect was normal [Impaired Insight] : insight and judgment were intact [Skin Color & Pigmentation] : normal skin color and pigmentation [] : no rash [Skin Turgor] : normal skin turgor [Skin Lesions] : no skin lesions

## 2022-02-07 NOTE — ED ADULT TRIAGE NOTE - NS ED NURSE BANDS TYPE
----- Message from Rocio Lizeth sent at 2/7/2022  8:05 AM CST -----  Regarding: Nurse Visit Reschedule  Contact: patient  Type:  Reschedule Appointment Request    Name of Caller:  Patient  Best Call Back Number:  [phone] (home)   Additional Information:  Patient states that he is unable to make it to his nurse visit today and needs to reschedule to another day. Please contact the patient to reschedule. Thanks!       Name band;

## 2022-02-11 ENCOUNTER — APPOINTMENT (OUTPATIENT)
Dept: INTERNAL MEDICINE | Facility: CLINIC | Age: 67
End: 2022-02-11
Payer: MEDICARE

## 2022-02-11 VITALS
HEIGHT: 61 IN | WEIGHT: 98 LBS | SYSTOLIC BLOOD PRESSURE: 110 MMHG | DIASTOLIC BLOOD PRESSURE: 66 MMHG | BODY MASS INDEX: 18.5 KG/M2

## 2022-02-11 PROCEDURE — 99214 OFFICE O/P EST MOD 30 MIN: CPT

## 2022-02-11 NOTE — HISTORY OF PRESENT ILLNESS
[FreeTextEntry1] : Diabetes, hypothyroid, dyslipidemia, and anemia [de-identified] : no complaints\par

## 2022-02-11 NOTE — PHYSICAL EXAM
[No Acute Distress] : no acute distress [Well-Appearing] : well-appearing [No Respiratory Distress] : no respiratory distress  [No Accessory Muscle Use] : no accessory muscle use [Clear to Auscultation] : lungs were clear to auscultation bilaterally [Normal Rate] : normal rate  [Regular Rhythm] : with a regular rhythm [No Edema] : there was no peripheral edema [Soft] : abdomen soft [No CVA Tenderness] : no CVA  tenderness [No Spinal Tenderness] : no spinal tenderness [Normal Gait] : normal gait [Speech Grossly Normal] : speech grossly normal [Normal Affect] : the affect was normal [Normal Mood] : the mood was normal

## 2022-02-11 NOTE — ASSESSMENT
[FreeTextEntry1] : Continue same diabetes medication.  Follow hemoglobin A1C.  Sees endocrinologist at [facility] twice a year\par Follow lipid.  Want HDL> 50\par Follow CBC\par Continue same thyroid medication.  Follow TFT.\par

## 2022-02-20 ENCOUNTER — RX RENEWAL (OUTPATIENT)
Age: 67
End: 2022-02-20

## 2022-04-20 ENCOUNTER — APPOINTMENT (OUTPATIENT)
Dept: INTERNAL MEDICINE | Facility: CLINIC | Age: 67
End: 2022-04-20
Payer: MEDICAID

## 2022-04-20 PROCEDURE — 99213 OFFICE O/P EST LOW 20 MIN: CPT | Mod: 95

## 2022-06-27 ENCOUNTER — RX RENEWAL (OUTPATIENT)
Age: 67
End: 2022-06-27

## 2022-06-29 ENCOUNTER — APPOINTMENT (OUTPATIENT)
Dept: INTERNAL MEDICINE | Facility: CLINIC | Age: 67
End: 2022-06-29
Payer: MEDICAID

## 2022-06-29 VITALS
HEIGHT: 61 IN | SYSTOLIC BLOOD PRESSURE: 100 MMHG | DIASTOLIC BLOOD PRESSURE: 56 MMHG | BODY MASS INDEX: 18.31 KG/M2 | WEIGHT: 97 LBS

## 2022-06-29 DIAGNOSIS — E55.9 VITAMIN D DEFICIENCY, UNSPECIFIED: ICD-10-CM

## 2022-06-29 PROCEDURE — 93000 ELECTROCARDIOGRAM COMPLETE: CPT

## 2022-06-29 PROCEDURE — 99397 PER PM REEVAL EST PAT 65+ YR: CPT | Mod: 25

## 2022-06-29 PROCEDURE — 36415 COLL VENOUS BLD VENIPUNCTURE: CPT

## 2022-06-29 NOTE — PHYSICAL EXAM
[No Acute Distress] : no acute distress [Well-Appearing] : well-appearing [Supple] : supple [No Respiratory Distress] : no respiratory distress  [No Accessory Muscle Use] : no accessory muscle use [Clear to Auscultation] : lungs were clear to auscultation bilaterally [Normal Rate] : normal rate  [Regular Rhythm] : with a regular rhythm [No Edema] : there was no peripheral edema [Soft] : abdomen soft [Non Tender] : non-tender [Normal Posterior Cervical Nodes] : no posterior cervical lymphadenopathy [Normal Anterior Cervical Nodes] : no anterior cervical lymphadenopathy [Normal Gait] : normal gait [Speech Grossly Normal] : speech grossly normal [Normal Affect] : the affect was normal [Normal Mood] : the mood was normal [Right Foot Was Examined] : Right foot ~C was examined [Left Foot Was Examined] : left foot ~C was examined [None] : no ulcers in either foot were found [de-identified] : No tongue  lesion seen

## 2022-06-29 NOTE — ASSESSMENT
[FreeTextEntry1] : RX CT calcium score (NW)\par Diabetes-hemoglobin A1c.  Urine albumin/creatinine.  Continue metformin 500 mg 2 tablets in the morning and 1 tablet in the evening\par Lipid\par Hypothyroid-TFT.  Continue levothyroxine 50 mcg every other day alternating with 25 mcg every other day\par CBC.  Iron studies\par Blood was drawn at office today.\par

## 2022-07-03 LAB
25(OH)D3 SERPL-MCNC: 55.1 NG/ML
ALBUMIN SERPL ELPH-MCNC: 4.5 G/DL
ALP BLD-CCNC: 44 U/L
ALT SERPL-CCNC: 22 U/L
ANION GAP SERPL CALC-SCNC: 9 MMOL/L
AST SERPL-CCNC: 18 U/L
BASOPHILS # BLD AUTO: 0.1 K/UL
BASOPHILS NFR BLD AUTO: 1.6 %
BILIRUB SERPL-MCNC: 0.4 MG/DL
BUN SERPL-MCNC: 22 MG/DL
CALCIUM SERPL-MCNC: 10 MG/DL
CHLORIDE SERPL-SCNC: 104 MMOL/L
CHOLEST SERPL-MCNC: 139 MG/DL
CO2 SERPL-SCNC: 28 MMOL/L
CREAT SERPL-MCNC: 0.6 MG/DL
CREAT SPEC-SCNC: 54 MG/DL
CRP SERPL-MCNC: <3 MG/L
EGFR: 99 ML/MIN/1.73M2
EOSINOPHIL # BLD AUTO: 0.3 K/UL
EOSINOPHIL NFR BLD AUTO: 4.7 %
ESTIMATED AVERAGE GLUCOSE: 148 MG/DL
FOLATE SERPL-MCNC: >20 NG/ML
GLUCOSE SERPL-MCNC: 120 MG/DL
HBA1C MFR BLD HPLC: 6.8 %
HCT VFR BLD CALC: 36.8 %
HDLC SERPL-MCNC: 39 MG/DL
HGB BLD-MCNC: 12.2 G/DL
IMM GRANULOCYTES NFR BLD AUTO: 0.3 %
IRON SATN MFR SERPL: 42 %
IRON SERPL-MCNC: 127 UG/DL
LDLC SERPL CALC-MCNC: 79 MG/DL
LYMPHOCYTES # BLD AUTO: 2.32 K/UL
LYMPHOCYTES NFR BLD AUTO: 36.1 %
MAN DIFF?: NORMAL
MCHC RBC-ENTMCNC: 32.5 PG
MCHC RBC-ENTMCNC: 33.2 GM/DL
MCV RBC AUTO: 98.1 FL
MICROALBUMIN 24H UR DL<=1MG/L-MCNC: <1.2 MG/DL
MICROALBUMIN/CREAT 24H UR-RTO: NORMAL MG/G
MONOCYTES # BLD AUTO: 0.54 K/UL
MONOCYTES NFR BLD AUTO: 8.4 %
NEUTROPHILS # BLD AUTO: 3.14 K/UL
NEUTROPHILS NFR BLD AUTO: 48.9 %
NONHDLC SERPL-MCNC: 100 MG/DL
PLATELET # BLD AUTO: 379 K/UL
POTASSIUM SERPL-SCNC: 4.6 MMOL/L
PROT SERPL-MCNC: 7.2 G/DL
RBC # BLD: 3.75 M/UL
RBC # FLD: 12.3 %
SODIUM SERPL-SCNC: 141 MMOL/L
T4 FREE SERPL-MCNC: 1.5 NG/DL
TIBC SERPL-MCNC: 302 UG/DL
TRIGL SERPL-MCNC: 107 MG/DL
TSH SERPL-ACNC: 3.22 UIU/ML
UIBC SERPL-MCNC: 175 UG/DL
VIT B12 SERPL-MCNC: 933 PG/ML
WBC # FLD AUTO: 6.42 K/UL

## 2022-08-21 ENCOUNTER — RX RENEWAL (OUTPATIENT)
Age: 67
End: 2022-08-21

## 2022-11-14 ENCOUNTER — RX RENEWAL (OUTPATIENT)
Age: 67
End: 2022-11-14

## 2022-12-12 ENCOUNTER — RX RENEWAL (OUTPATIENT)
Age: 67
End: 2022-12-12

## 2022-12-14 ENCOUNTER — APPOINTMENT (OUTPATIENT)
Dept: INTERNAL MEDICINE | Facility: CLINIC | Age: 67
End: 2022-12-14

## 2022-12-14 VITALS
DIASTOLIC BLOOD PRESSURE: 80 MMHG | BODY MASS INDEX: 18.88 KG/M2 | WEIGHT: 100 LBS | HEIGHT: 61 IN | SYSTOLIC BLOOD PRESSURE: 110 MMHG

## 2022-12-14 VITALS
SYSTOLIC BLOOD PRESSURE: 110 MMHG | HEIGHT: 61 IN | BODY MASS INDEX: 18.88 KG/M2 | WEIGHT: 100 LBS | DIASTOLIC BLOOD PRESSURE: 80 MMHG

## 2022-12-14 DIAGNOSIS — U07.1 COVID-19: ICD-10-CM

## 2022-12-14 PROCEDURE — 99214 OFFICE O/P EST MOD 30 MIN: CPT | Mod: 25

## 2022-12-14 NOTE — ASSESSMENT
[FreeTextEntry1] : Hypothyroid-continue levothyroxine 25 mcg every other day alternating with 50 mcg every other day.  TFT\par Diabetes-continue metformin 500 mg 2 tablets in the morning and 1 tablet in the evening.  Hemoglobin A1c\par Fasting lipid\par CBC\par RX CT calcium score (NW)\par Blood was drawn at office today.\par

## 2022-12-14 NOTE — HISTORY OF PRESENT ILLNESS
[FreeTextEntry1] : Anemia, diabetes, hypothyroid, and dyslipidemia [de-identified] : no complaints\par

## 2022-12-14 NOTE — PHYSICAL EXAM
[No Acute Distress] : no acute distress [Well-Appearing] : well-appearing [No Respiratory Distress] : no respiratory distress  [No Accessory Muscle Use] : no accessory muscle use [Clear to Auscultation] : lungs were clear to auscultation bilaterally [Normal Rate] : normal rate  [Regular Rhythm] : with a regular rhythm [Soft] : abdomen soft [No CVA Tenderness] : no CVA  tenderness

## 2022-12-16 ENCOUNTER — APPOINTMENT (OUTPATIENT)
Dept: CT IMAGING | Facility: CLINIC | Age: 67
End: 2022-12-16

## 2022-12-16 ENCOUNTER — RESULT REVIEW (OUTPATIENT)
Age: 67
End: 2022-12-16

## 2022-12-16 ENCOUNTER — OUTPATIENT (OUTPATIENT)
Dept: OUTPATIENT SERVICES | Facility: HOSPITAL | Age: 67
LOS: 1 days | End: 2022-12-16
Payer: SELF-PAY

## 2022-12-16 DIAGNOSIS — Z98.890 OTHER SPECIFIED POSTPROCEDURAL STATES: Chronic | ICD-10-CM

## 2022-12-16 DIAGNOSIS — Z00.8 ENCOUNTER FOR OTHER GENERAL EXAMINATION: ICD-10-CM

## 2022-12-16 PROCEDURE — 75571 CT HRT W/O DYE W/CA TEST: CPT

## 2022-12-16 PROCEDURE — 75571 CT HRT W/O DYE W/CA TEST: CPT | Mod: 26

## 2022-12-20 ENCOUNTER — NON-APPOINTMENT (OUTPATIENT)
Age: 67
End: 2022-12-20

## 2022-12-20 LAB
ALBUMIN SERPL ELPH-MCNC: 4.6 G/DL
ALP BLD-CCNC: 53 U/L
ALT SERPL-CCNC: 18 U/L
ANION GAP SERPL CALC-SCNC: 11 MMOL/L
AST SERPL-CCNC: 20 U/L
BASOPHILS # BLD AUTO: 0.12 K/UL
BASOPHILS NFR BLD AUTO: 1.4 %
BILIRUB SERPL-MCNC: 0.3 MG/DL
BUN SERPL-MCNC: 22 MG/DL
CALCIUM SERPL-MCNC: 9.6 MG/DL
CHLORIDE SERPL-SCNC: 102 MMOL/L
CHOLEST SERPL-MCNC: 158 MG/DL
CO2 SERPL-SCNC: 27 MMOL/L
CREAT SERPL-MCNC: 0.57 MG/DL
EGFR: 100 ML/MIN/1.73M2
EOSINOPHIL # BLD AUTO: 1.34 K/UL
EOSINOPHIL NFR BLD AUTO: 15.1 %
ESTIMATED AVERAGE GLUCOSE: 151 MG/DL
GLUCOSE SERPL-MCNC: 121 MG/DL
HBA1C MFR BLD HPLC: 6.9 %
HCT VFR BLD CALC: 38.1 %
HDLC SERPL-MCNC: 43 MG/DL
HGB BLD-MCNC: 12.6 G/DL
IMM GRANULOCYTES NFR BLD AUTO: 0.1 %
LDLC SERPL CALC-MCNC: 94 MG/DL
LYMPHOCYTES # BLD AUTO: 2.72 K/UL
LYMPHOCYTES NFR BLD AUTO: 30.7 %
MAN DIFF?: NORMAL
MCHC RBC-ENTMCNC: 33 PG
MCHC RBC-ENTMCNC: 33.1 GM/DL
MCV RBC AUTO: 99.7 FL
MONOCYTES # BLD AUTO: 0.72 K/UL
MONOCYTES NFR BLD AUTO: 8.1 %
NEUTROPHILS # BLD AUTO: 3.96 K/UL
NEUTROPHILS NFR BLD AUTO: 44.6 %
NONHDLC SERPL-MCNC: 115 MG/DL
PLATELET # BLD AUTO: 346 K/UL
POTASSIUM SERPL-SCNC: 4.5 MMOL/L
PROT SERPL-MCNC: 7.6 G/DL
RBC # BLD: 3.82 M/UL
RBC # FLD: 12.1 %
SODIUM SERPL-SCNC: 140 MMOL/L
T4 FREE SERPL-MCNC: 1.2 NG/DL
TRIGL SERPL-MCNC: 104 MG/DL
TSH SERPL-ACNC: 3.94 UIU/ML
WBC # FLD AUTO: 8.87 K/UL

## 2023-02-17 ENCOUNTER — RX RENEWAL (OUTPATIENT)
Age: 68
End: 2023-02-17

## 2023-02-18 NOTE — ED ADULT NURSE NOTE - OBJECTIVE STATEMENT
65yo female received in room 4. Pt ambulatory and A&Ox4. Pt was told by her provider to come to ED for elevated potassium. Pt c/o of persistent dry cough for the past two weeks. Pt denies headache, chest pain, n/v/d, fever, chill, shortness of breath, dizziness and weakness. Pt placed on cardiac monitor. Respiration even and non-labored. 20G IV placed in left AC, flushed without difficulty and positive blood return. Will continue to monitor. Home

## 2023-03-28 ENCOUNTER — NON-APPOINTMENT (OUTPATIENT)
Age: 68
End: 2023-03-28

## 2023-04-05 ENCOUNTER — APPOINTMENT (OUTPATIENT)
Dept: INTERNAL MEDICINE | Facility: CLINIC | Age: 68
End: 2023-04-05
Payer: MEDICARE

## 2023-04-05 VITALS
HEIGHT: 61 IN | WEIGHT: 100 LBS | BODY MASS INDEX: 18.88 KG/M2 | DIASTOLIC BLOOD PRESSURE: 70 MMHG | SYSTOLIC BLOOD PRESSURE: 110 MMHG

## 2023-04-05 PROCEDURE — 99214 OFFICE O/P EST MOD 30 MIN: CPT

## 2023-04-05 NOTE — HISTORY OF PRESENT ILLNESS
[FreeTextEntry1] : Diabetes, dyslipidemia, anemia, hypothyroid, and osteopenia [de-identified] : no complaints\par

## 2023-04-05 NOTE — ASSESSMENT
[FreeTextEntry1] : Diabetes.  Hemoglobin A1c result from 3 weeks was reviewed with patient.  Continue metformin 500 mg twice a day. Sees endocrinologist twice a year\par Dyslipidemia.   Triglyceride, HDL, and LDL results from 3 weeks were reviewed with patient.  Want triglyceride<150.  Want HDL> 51.  Want LDL<100\par Hypothyroid.  TSH and free T4 results from 3 weeks were reviewed with patient.  Continue levothyroxine 25 mg every other day alternating with 50 mg every other day.\par CPE in August\par Discussed with patient importance of healthy diet, regular exercise (> 2.5 hours/week), and maintaining healthy weight.\par Osteopenia-DEXA in 9/2023\par Follow CBC\par \par \par

## 2023-07-24 ENCOUNTER — LABORATORY RESULT (OUTPATIENT)
Age: 68
End: 2023-07-24

## 2023-07-24 ENCOUNTER — APPOINTMENT (OUTPATIENT)
Dept: INTERNAL MEDICINE | Facility: CLINIC | Age: 68
End: 2023-07-24
Payer: MEDICARE

## 2023-07-24 PROCEDURE — 36415 COLL VENOUS BLD VENIPUNCTURE: CPT

## 2023-07-25 LAB
25(OH)D3 SERPL-MCNC: 38.5 NG/ML
ALBUMIN SERPL ELPH-MCNC: 4.5 G/DL
ALP BLD-CCNC: 51 U/L
ALT SERPL-CCNC: 19 U/L
ANION GAP SERPL CALC-SCNC: 11 MMOL/L
AST SERPL-CCNC: 20 U/L
BILIRUB SERPL-MCNC: 0.2 MG/DL
BUN SERPL-MCNC: 18 MG/DL
CALCIUM SERPL-MCNC: 10 MG/DL
CHLORIDE SERPL-SCNC: 103 MMOL/L
CHOLEST SERPL-MCNC: 176 MG/DL
CO2 SERPL-SCNC: 25 MMOL/L
CREAT SERPL-MCNC: 0.64 MG/DL
EGFR: 97 ML/MIN/1.73M2
ESTIMATED AVERAGE GLUCOSE: 151 MG/DL
GLUCOSE SERPL-MCNC: 123 MG/DL
HBA1C MFR BLD HPLC: 6.9 %
HDLC SERPL-MCNC: 38 MG/DL
LDLC SERPL CALC-MCNC: 108 MG/DL
NONHDLC SERPL-MCNC: 137 MG/DL
POTASSIUM SERPL-SCNC: 4.6 MMOL/L
PROT SERPL-MCNC: 7.3 G/DL
SODIUM SERPL-SCNC: 139 MMOL/L
T3 SERPL-MCNC: 83 NG/DL
T3RU NFR SERPL: 1 TBI
T4 FREE SERPL-MCNC: 1.3 NG/DL
TRIGL SERPL-MCNC: 167 MG/DL
TSH SERPL-ACNC: 3.18 UIU/ML

## 2023-07-31 ENCOUNTER — APPOINTMENT (OUTPATIENT)
Dept: INTERNAL MEDICINE | Facility: CLINIC | Age: 68
End: 2023-07-31
Payer: MEDICARE

## 2023-07-31 VITALS
SYSTOLIC BLOOD PRESSURE: 100 MMHG | BODY MASS INDEX: 18.88 KG/M2 | DIASTOLIC BLOOD PRESSURE: 56 MMHG | HEIGHT: 61 IN | WEIGHT: 100 LBS

## 2023-07-31 DIAGNOSIS — Z71.89 OTHER SPECIFIED COUNSELING: ICD-10-CM

## 2023-07-31 PROCEDURE — 93000 ELECTROCARDIOGRAM COMPLETE: CPT

## 2023-07-31 PROCEDURE — 99214 OFFICE O/P EST MOD 30 MIN: CPT | Mod: 25

## 2023-07-31 PROCEDURE — G0439: CPT

## 2023-07-31 NOTE — HISTORY OF PRESENT ILLNESS
[FreeTextEntry1] : Diabetes, dyslipidemia, hypothyroid, anemia, and osteopenia [de-identified] : See Review of Systems

## 2023-07-31 NOTE — HISTORY OF PRESENT ILLNESS
[FreeTextEntry1] : Diabetes, dyslipidemia, hypothyroid, anemia, and osteopenia [de-identified] : See Review of Systems

## 2023-07-31 NOTE — HEALTH RISK ASSESSMENT
[Good] : ~his/her~  mood as  good [No] : No [No falls in past year] : Patient reported no falls in the past year [0] : 2) Feeling down, depressed, or hopeless: Not at all (0) [de-identified] : walk [de-identified] : low fat and sugar [GSI8Nrbkg] : 0 [Patient reported mammogram was normal] : Patient reported mammogram was normal [Patient reported PAP Smear was normal] : Patient reported PAP Smear was normal [Patient reported bone density results were abnormal] : Patient reported bone density results were abnormal [Patient reported colonoscopy was normal] : Patient reported colonoscopy was normal [HIV test declined] : HIV test declined [Change in mental status noted] : No change in mental status noted [Language] : denies difficulty with language [Behavior] : denies difficulty with behavior [Learning/Retaining New Information] : denies difficulty learning/retaining new information [Handling Complex Tasks] : denies difficulty handling complex tasks [Reasoning] : denies difficulty with reasoning [Spatial Ability and Orientation] : denies difficulty with spatial ability and orientation [None] : None [With Family] : lives with family [# of Members in Household ___] :  household currently consist of [unfilled] member(s) [Retired] : retired [] :  [Feels Safe at Home] : Feels safe at home [Fully functional (bathing, dressing, toileting, transferring, walking, feeding)] : Fully functional (bathing, dressing, toileting, transferring, walking, feeding) [Fully functional (using the telephone, shopping, preparing meals, housekeeping, doing laundry, using] : Fully functional and needs no help or supervision to perform IADLs (using the telephone, shopping, preparing meals, housekeeping, doing laundry, using transportation, managing medications and managing finances) [Smoke Detector] : smoke detector [Seat Belt] :  uses seat belt [Designated Healthcare Proxy] : Designated healthcare proxy [Name: ___] : Health Care Proxy's Name: [unfilled]  [Relationship: ___] : Relationship: [unfilled] [Never] : Never

## 2023-07-31 NOTE — HEALTH RISK ASSESSMENT
[Good] : ~his/her~  mood as  good [No] : No [No falls in past year] : Patient reported no falls in the past year [0] : 2) Feeling down, depressed, or hopeless: Not at all (0) [de-identified] : walk [de-identified] : low fat and sugar [LEF3Gybfn] : 0 [Patient reported mammogram was normal] : Patient reported mammogram was normal [Patient reported PAP Smear was normal] : Patient reported PAP Smear was normal [Patient reported bone density results were abnormal] : Patient reported bone density results were abnormal [Patient reported colonoscopy was normal] : Patient reported colonoscopy was normal [HIV test declined] : HIV test declined [Change in mental status noted] : No change in mental status noted [Language] : denies difficulty with language [Behavior] : denies difficulty with behavior [Learning/Retaining New Information] : denies difficulty learning/retaining new information [Handling Complex Tasks] : denies difficulty handling complex tasks [Reasoning] : denies difficulty with reasoning [Spatial Ability and Orientation] : denies difficulty with spatial ability and orientation [None] : None [With Family] : lives with family [# of Members in Household ___] :  household currently consist of [unfilled] member(s) [Retired] : retired [] :  [Feels Safe at Home] : Feels safe at home [Fully functional (bathing, dressing, toileting, transferring, walking, feeding)] : Fully functional (bathing, dressing, toileting, transferring, walking, feeding) [Fully functional (using the telephone, shopping, preparing meals, housekeeping, doing laundry, using] : Fully functional and needs no help or supervision to perform IADLs (using the telephone, shopping, preparing meals, housekeeping, doing laundry, using transportation, managing medications and managing finances) [Smoke Detector] : smoke detector [Seat Belt] :  uses seat belt [Designated Healthcare Proxy] : Designated healthcare proxy [Name: ___] : Health Care Proxy's Name: [unfilled]  [Relationship: ___] : Relationship: [unfilled] [Never] : Never

## 2023-07-31 NOTE — ASSESSMENT
[FreeTextEntry1] : Diabetes-continue metformin 500 mg twice daily.  Urine albumin/creatinine today.  Hemoglobin A1c result from last week was reviewed with patient. Dyslipidemia-want HDL> 50.  Want triglyceride<150.  Want LDL<100.   Triglyceride, HDL, and LDL results from last week were reviewed with patient. Hypothyroid-continue levothyroxine 25 mcg every other day alternating with 50 mcg every other day.  TSH and free T4 results from last week were reviewed with patient. Osteopenia-DEXA Follow-up in 4 months Patient was instructed that next COVID-19 vaccine will be fall of this year. CBC results from last week were reviewed with patient. Discussed with patient importance of healthy diet, regular exercise (> 2.5 hours/week), and maintaining healthy weight.

## 2023-07-31 NOTE — PHYSICAL EXAM
[Well-Appearing] : well-appearing [Supple] : supple [No CVA Tenderness] : no CVA  tenderness [Normal Gait] : normal gait [Speech Grossly Normal] : speech grossly normal [Normal Affect] : the affect was normal [Normal Mood] : the mood was normal [Right Foot Was Examined] : Right foot ~C was examined [Left Foot Was Examined] : left foot ~C was examined [None] : no ulcers in either foot were found [No Acute Distress] : no acute distress [No Respiratory Distress] : no respiratory distress  [No Accessory Muscle Use] : no accessory muscle use [Clear to Auscultation] : lungs were clear to auscultation bilaterally [Normal Rate] : normal rate  [Regular Rhythm] : with a regular rhythm [No Edema] : there was no peripheral edema [Soft] : abdomen soft [Non Tender] : non-tender [Normal Posterior Cervical Nodes] : no posterior cervical lymphadenopathy [Normal Anterior Cervical Nodes] : no anterior cervical lymphadenopathy

## 2023-08-01 LAB
CREAT SPEC-SCNC: 100 MG/DL
MICROALBUMIN 24H UR DL<=1MG/L-MCNC: <1.2 MG/DL
MICROALBUMIN/CREAT 24H UR-RTO: NORMAL MG/G

## 2023-09-18 ENCOUNTER — APPOINTMENT (OUTPATIENT)
Dept: RADIOLOGY | Facility: CLINIC | Age: 68
End: 2023-09-18
Payer: MEDICARE

## 2023-09-18 PROCEDURE — 77080 DXA BONE DENSITY AXIAL: CPT

## 2023-09-27 ENCOUNTER — RX RENEWAL (OUTPATIENT)
Age: 68
End: 2023-09-27

## 2023-10-12 ENCOUNTER — RX RENEWAL (OUTPATIENT)
Age: 68
End: 2023-10-12

## 2023-10-12 RX ORDER — BLOOD SUGAR DIAGNOSTIC
STRIP MISCELLANEOUS
Qty: 100 | Refills: 3 | Status: ACTIVE | COMMUNITY
Start: 2018-06-19 | End: 1900-01-01

## 2023-12-02 ENCOUNTER — RX RENEWAL (OUTPATIENT)
Age: 68
End: 2023-12-02

## 2023-12-02 RX ORDER — METFORMIN HYDROCHLORIDE 500 MG/1
500 TABLET, COATED ORAL TWICE DAILY
Qty: 180 | Refills: 3 | Status: ACTIVE | COMMUNITY
Start: 2021-08-11 | End: 1900-01-01

## 2023-12-19 ENCOUNTER — APPOINTMENT (OUTPATIENT)
Dept: INTERNAL MEDICINE | Facility: CLINIC | Age: 68
End: 2023-12-19
Payer: MEDICARE

## 2023-12-19 PROCEDURE — 36415 COLL VENOUS BLD VENIPUNCTURE: CPT

## 2023-12-20 LAB
ALBUMIN SERPL ELPH-MCNC: 4.5 G/DL
ALP BLD-CCNC: 47 U/L
ALT SERPL-CCNC: 16 U/L
ANION GAP SERPL CALC-SCNC: 10 MMOL/L
AST SERPL-CCNC: 21 U/L
BILIRUB SERPL-MCNC: 0.4 MG/DL
BUN SERPL-MCNC: 19 MG/DL
CALCIUM SERPL-MCNC: 9.9 MG/DL
CHLORIDE SERPL-SCNC: 103 MMOL/L
CHOLEST SERPL-MCNC: 159 MG/DL
CO2 SERPL-SCNC: 28 MMOL/L
CREAT SERPL-MCNC: 0.6 MG/DL
EGFR: 98 ML/MIN/1.73M2
ESTIMATED AVERAGE GLUCOSE: 151 MG/DL
GLUCOSE SERPL-MCNC: 115 MG/DL
HBA1C MFR BLD HPLC: 6.9 %
HDLC SERPL-MCNC: 39 MG/DL
LDLC SERPL CALC-MCNC: 99 MG/DL
NONHDLC SERPL-MCNC: 120 MG/DL
POTASSIUM SERPL-SCNC: 4.7 MMOL/L
PROT SERPL-MCNC: 7.4 G/DL
SODIUM SERPL-SCNC: 141 MMOL/L
T4 FREE SERPL-MCNC: 1.4 NG/DL
TRIGL SERPL-MCNC: 119 MG/DL
TSH SERPL-ACNC: 4.14 UIU/ML

## 2023-12-21 LAB
HCT VFR BLD CALC: 39.3 %
HGB BLD-MCNC: 12.5 G/DL
MCHC RBC-ENTMCNC: 31.8 GM/DL
MCHC RBC-ENTMCNC: 32 PG
MCV RBC AUTO: 100.5 FL
PLATELET # BLD AUTO: 355 K/UL
RBC # BLD: 3.91 M/UL
RBC # FLD: 12.2 %
WBC # FLD AUTO: 6.31 K/UL

## 2023-12-26 ENCOUNTER — APPOINTMENT (OUTPATIENT)
Dept: INTERNAL MEDICINE | Facility: CLINIC | Age: 68
End: 2023-12-26
Payer: MEDICARE

## 2023-12-26 VITALS
WEIGHT: 101 LBS | BODY MASS INDEX: 19.07 KG/M2 | HEIGHT: 61 IN | DIASTOLIC BLOOD PRESSURE: 60 MMHG | SYSTOLIC BLOOD PRESSURE: 110 MMHG

## 2023-12-26 DIAGNOSIS — Z00.00 ENCOUNTER FOR GENERAL ADULT MEDICAL EXAMINATION W/OUT ABNORMAL FINDINGS: ICD-10-CM

## 2023-12-26 PROCEDURE — 99214 OFFICE O/P EST MOD 30 MIN: CPT

## 2024-02-20 ENCOUNTER — RX RENEWAL (OUTPATIENT)
Age: 69
End: 2024-02-20

## 2024-02-20 RX ORDER — LEVOTHYROXINE SODIUM 0.03 MG/1
25 TABLET ORAL
Qty: 90 | Refills: 1 | Status: ACTIVE | COMMUNITY
Start: 2017-04-26 | End: 1900-01-01

## 2024-03-07 ENCOUNTER — APPOINTMENT (OUTPATIENT)
Dept: OTOLARYNGOLOGY | Facility: CLINIC | Age: 69
End: 2024-03-07
Payer: MEDICARE

## 2024-03-07 VITALS
HEART RATE: 84 BPM | SYSTOLIC BLOOD PRESSURE: 110 MMHG | DIASTOLIC BLOOD PRESSURE: 60 MMHG | WEIGHT: 101 LBS | BODY MASS INDEX: 19.07 KG/M2 | HEIGHT: 61 IN

## 2024-03-07 PROCEDURE — 99204 OFFICE O/P NEW MOD 45 MIN: CPT | Mod: 25

## 2024-03-07 PROCEDURE — 31231 NASAL ENDOSCOPY DX: CPT

## 2024-03-07 RX ORDER — FLUTICASONE PROPIONATE 50 UG/1
50 SPRAY, METERED NASAL DAILY
Qty: 1 | Refills: 0 | Status: ACTIVE | COMMUNITY
Start: 2024-03-07 | End: 1900-01-01

## 2024-03-07 NOTE — CONSULT LETTER
[Dear  ___] : Dear  [unfilled], [Consult Letter:] : I had the pleasure of evaluating your patient, [unfilled]. [Please see my note below.] : Please see my note below. [Consult Closing:] : Thank you very much for allowing me to participate in the care of this patient.  If you have any questions, please do not hesitate to contact me. [Sincerely,] : Sincerely, [FreeTextEntry3] : Juan Garcia MD [facility] Associated Professor, [place]

## 2024-03-07 NOTE — ASSESSMENT
[FreeTextEntry1] : Patient diminished clogged sensation of her ears popping recently was in the diminished in [place] flew back thinks it may be wax on examination no wax endoscopically she had prior sinus surgery everything seems fine I have encouraged her to use some Flonase this is all consistent with eustachian tube dysfunction and should be self-limiting.

## 2024-03-07 NOTE — HISTORY OF PRESENT ILLNESS
[de-identified] : Patient was  at a spa and thinks she got water in her ear. She then went on an airplane and the already clogged ears got worse. She used Debrox in both ears that helped a little but she still has some noises when she swallows in both ears. The hearing is a little better but she still is concerned.

## 2024-03-21 ENCOUNTER — APPOINTMENT (OUTPATIENT)
Dept: GASTROENTEROLOGY | Facility: CLINIC | Age: 69
End: 2024-03-21
Payer: MEDICARE

## 2024-03-21 ENCOUNTER — LABORATORY RESULT (OUTPATIENT)
Age: 69
End: 2024-03-21

## 2024-03-21 PROCEDURE — 45380 COLONOSCOPY AND BIOPSY: CPT

## 2024-03-21 PROCEDURE — 43239 EGD BIOPSY SINGLE/MULTIPLE: CPT

## 2024-05-03 ENCOUNTER — APPOINTMENT (OUTPATIENT)
Dept: INTERNAL MEDICINE | Facility: CLINIC | Age: 69
End: 2024-05-03
Payer: MEDICARE

## 2024-05-03 PROCEDURE — 36415 COLL VENOUS BLD VENIPUNCTURE: CPT

## 2024-05-04 LAB
ALBUMIN SERPL ELPH-MCNC: 4.5 G/DL
ALP BLD-CCNC: 57 U/L
ALT SERPL-CCNC: 22 U/L
ANION GAP SERPL CALC-SCNC: 11 MMOL/L
AST SERPL-CCNC: 26 U/L
BILIRUB SERPL-MCNC: 0.2 MG/DL
BUN SERPL-MCNC: 19 MG/DL
CALCIUM SERPL-MCNC: 9.5 MG/DL
CHLORIDE SERPL-SCNC: 102 MMOL/L
CHOLEST SERPL-MCNC: 181 MG/DL
CO2 SERPL-SCNC: 27 MMOL/L
CREAT SERPL-MCNC: 0.58 MG/DL
EGFR: 99 ML/MIN/1.73M2
ESTIMATED AVERAGE GLUCOSE: 146 MG/DL
GLUCOSE SERPL-MCNC: 122 MG/DL
HBA1C MFR BLD HPLC: 6.7 %
HCT VFR BLD CALC: 41.5 %
HDLC SERPL-MCNC: 52 MG/DL
HGB BLD-MCNC: 13 G/DL
IRON SATN MFR SERPL: 33 %
IRON SERPL-MCNC: 93 UG/DL
LDLC SERPL CALC-MCNC: 110 MG/DL
MCHC RBC-ENTMCNC: 31.3 GM/DL
MCHC RBC-ENTMCNC: 32.5 PG
MCV RBC AUTO: 103.8 FL
NONHDLC SERPL-MCNC: 129 MG/DL
PLATELET # BLD AUTO: 397 K/UL
POTASSIUM SERPL-SCNC: 4.5 MMOL/L
PROT SERPL-MCNC: 7.7 G/DL
RBC # BLD: 4 M/UL
RBC # FLD: 12.5 %
SODIUM SERPL-SCNC: 140 MMOL/L
T4 FREE SERPL-MCNC: 1.4 NG/DL
TIBC SERPL-MCNC: 280 UG/DL
TRIGL SERPL-MCNC: 101 MG/DL
TSH SERPL-ACNC: 1.92 UIU/ML
UIBC SERPL-MCNC: 187 UG/DL
WBC # FLD AUTO: 9.52 K/UL

## 2024-05-09 ENCOUNTER — APPOINTMENT (OUTPATIENT)
Dept: INTERNAL MEDICINE | Facility: CLINIC | Age: 69
End: 2024-05-09
Payer: MEDICARE

## 2024-05-09 VITALS
BODY MASS INDEX: 18.69 KG/M2 | DIASTOLIC BLOOD PRESSURE: 70 MMHG | WEIGHT: 99 LBS | HEART RATE: 80 BPM | HEIGHT: 61 IN | SYSTOLIC BLOOD PRESSURE: 110 MMHG | OXYGEN SATURATION: 98 %

## 2024-05-09 DIAGNOSIS — E78.00 PURE HYPERCHOLESTEROLEMIA, UNSPECIFIED: ICD-10-CM

## 2024-05-09 DIAGNOSIS — E11.9 TYPE 2 DIABETES MELLITUS W/OUT COMPLICATIONS: ICD-10-CM

## 2024-05-09 DIAGNOSIS — H69.93 UNSPECIFIED EUSTACHIAN TUBE DISORDER, BILATERAL: ICD-10-CM

## 2024-05-09 DIAGNOSIS — E78.5 HYPERLIPIDEMIA, UNSPECIFIED: ICD-10-CM

## 2024-05-09 DIAGNOSIS — M25.511 PAIN IN RIGHT SHOULDER: ICD-10-CM

## 2024-05-09 DIAGNOSIS — R10.13 EPIGASTRIC PAIN: ICD-10-CM

## 2024-05-09 DIAGNOSIS — M25.512 PAIN IN RIGHT SHOULDER: ICD-10-CM

## 2024-05-09 DIAGNOSIS — M85.89 OTHER SPECIFIED DISORDERS OF BONE DENSITY AND STRUCTURE, MULTIPLE SITES: ICD-10-CM

## 2024-05-09 DIAGNOSIS — D64.9 ANEMIA, UNSPECIFIED: ICD-10-CM

## 2024-05-09 DIAGNOSIS — E03.9 HYPOTHYROIDISM, UNSPECIFIED: ICD-10-CM

## 2024-05-09 PROCEDURE — 99214 OFFICE O/P EST MOD 30 MIN: CPT

## 2024-05-09 PROCEDURE — G0009: CPT

## 2024-05-09 PROCEDURE — 90677 PCV20 VACCINE IM: CPT

## 2024-05-09 PROCEDURE — G2211 COMPLEX E/M VISIT ADD ON: CPT

## 2024-05-09 NOTE — ASSESSMENT
[FreeTextEntry1] : Bilateral shoulder pain.  NW PT Discussed with patient importance of healthy diet, regular exercise (> 2.5 hours/week), and maintaining healthy weight. CBC results from last week were reviewed with patient. Diabetes.  Hemoglobin A1c result from last week was reviewed with patient.  Continue metformin 500 mg twice a day. Hyperlipidemia.  want LDL<100.   Triglyceride, HDL, and LDL results from last week were reviewed with patient. Hypothyroid.  TSH and free T4 results from last week were reviewed with patient.  Continue levothyroxine 25 mcg every 2 days alternating with 50 mcg every other day.   CPE in 4 months COVID-19  booster vaccine is recommended once a year in fall.  If patient did not receive COVID-19 booster vaccine in fall 2023, I strongly recommend they go to local pharmacy for COVID-19 booster vaccine within next 2 weeks. prevnar 20 today

## 2024-05-09 NOTE — HISTORY OF PRESENT ILLNESS
[FreeTextEntry1] : Follow-up of diabetes, hyperlipidemia, hypothyroid, and anemia [de-identified] : Mild intermittent bilateral shoulder pain.  No injury.  No arm numbness or weakness.  No fever.

## 2024-08-20 NOTE — PACU DISCHARGE NOTE - NAUSEA/VOMITING:
Time: 3:21 AM EDT  Date of encounter:  2024  Independent Historian/Clinical History and Information was obtained by:   Patient    History is limited by: N/A    Chief Complaint: LEFT FLANK/ABD PAIN      History of Present Illness:  The patient is a 40 y.o. year old female who presents to the emergency department for evaluation of left flank pain.  She states that it started tonight about 9 PM rather suddenly.  She states that she has been having some low back pain.  She states that when she was seen at her family practice on 2024 she was having more right flank pain.  She states that they ordered a KUB and she states she was told she had bilateral kidney stones but that they were not moving.  Tonight she states the pain had abruptly on the left has been very severe and persistent.  She reports no recent fevers.  She denies any urinary symptoms.  She states that she has passed a stone before but was probably a year or so ago.  She denies any abdominal pain or tenderness with palpation.      Patient Care Team  Primary Care Provider: Rachael Waters APRN    Past Medical History:     No Known Allergies  Past Medical History:   Diagnosis Date    Colon polyp     Have had colonoscopy bc of family histoey - polyps seen - no sognoficant fondings    Foot pain, left     GERD (gastroesophageal reflux disease)     Heel pain     Hypertension     Take a low dose of kedication daily    Numbness in feet      Past Surgical History:   Procedure Laterality Date     SECTION      COLONOSCOPY      COLONOSCOPY N/A 5/3/2024    Procedure: COLONOSCOPY WITH COLD SNARE POLYPECTOMY;  Surgeon: Amie Mcclure MD;  Location: [facility] ENDOSCOPY;  Service: Gastroenterology;  Laterality: N/A;  COLON POLYP, DIVERTICULOSIS    ENDOSCOPY N/A 5/3/2024    Procedure: ESOPHAGOGASTRODUODENOSCOPY WITH BIOPSIES AND 15-18MM BALLOON DILATATION;  Surgeon: Amie Mcclure MD;  Location: [facility] ENDOSCOPY;  Service:  Gastroenterology;  Laterality: N/A;  SCHATZKI'S RING, HIATAL HERNIA    TUBAL ABDOMINAL LIGATION  2008    UPPER GASTROINTESTINAL ENDOSCOPY       Family History   Problem Relation Age of Onset    Hypertension Mother     Diabetes Mother     Hypertension Brother     Colon cancer Maternal Grandmother         45s    Melanoma Neg Hx     Uterine cancer Neg Hx     Ovarian cancer Neg Hx     Breast cancer Neg Hx     Prostate cancer Neg Hx     Deep vein thrombosis Neg Hx     Pulmonary embolism Neg Hx     Coronary artery disease Neg Hx     Stroke Neg Hx     Osteoporosis Neg Hx        Home Medications:  Prior to Admission medications    Medication Sig Start Date End Date Taking? Authorizing Provider   ibuprofen (ADVIL,MOTRIN) 800 MG tablet Take 1 tablet by mouth Every 6 (Six) Hours As Needed for Moderate Pain. 12/3/23   Zhanna Guthrie APRN   lisinopril (PRINIVIL,ZESTRIL) 10 MG tablet TAKE 1 TABLET BY MOUTH ONCE DAILY FOR BLOOD PRESSURE 6/20/24   Rachael Waters APRN   pantoprazole (PROTONIX) 20 MG EC tablet Take 1 tablet by mouth Daily. 6/11/24   Jess Singleton APRN   vitamin D (ERGOCALCIFEROL) 1.25 MG (49698 UT) capsule capsule Take 1 capsule by mouth 1 (One) Time Per Week. 2/26/24   Rachael Waters APRN   Zepbound 2.5 MG/0.5ML solution auto-injector  5/8/24   Provider, MD Jocelyn        Social History:   Social History     Tobacco Use    Smoking status: Never     Passive exposure: Past    Smokeless tobacco: Never   Vaping Use    Vaping status: Never Used   Substance Use Topics    Alcohol use: Never    Drug use: Never         Review of Systems:  Review of Systems   Constitutional:  Negative for chills and fever.   HENT:  Negative for congestion, ear pain and sore throat.    Eyes:  Negative for pain.   Respiratory:  Negative for cough, chest tightness and shortness of breath.    Cardiovascular:  Negative for chest pain.   Gastrointestinal:  Positive for abdominal pain, nausea and vomiting.  "Negative for diarrhea.   Genitourinary:  Positive for decreased urine volume, difficulty urinating and flank pain. Negative for dysuria, frequency, hematuria and urgency.   Musculoskeletal:  Positive for back pain. Negative for joint swelling, neck pain and neck stiffness.   Skin:  Negative for pallor and rash.   Neurological:  Negative for seizures and headaches.   All other systems reviewed and are negative.       Physical Exam:  /81   Pulse 67   Temp 98.4 °F (36.9 °C) (Oral)   Resp 18   Ht 149.9 cm (59\")   Wt 88 kg (194 lb 0.1 oz)   LMP 08/20/2024 (Exact Date)   SpO2 95%   BMI 39.18 kg/m²     Physical Exam  Vitals and nursing note reviewed.   Constitutional:       General: She is not in acute distress.     Appearance: Normal appearance. She is not ill-appearing or toxic-appearing.   HENT:      Head: Normocephalic and atraumatic.   Eyes:      General: No scleral icterus.     Conjunctiva/sclera: Conjunctivae normal.      Pupils: Pupils are equal, round, and reactive to light.   Cardiovascular:      Rate and Rhythm: Normal rate and regular rhythm.      Pulses: Normal pulses.   Pulmonary:      Effort: Pulmonary effort is normal. No respiratory distress.   Abdominal:      General: Abdomen is flat.      Palpations: Abdomen is soft.      Tenderness: There is no abdominal tenderness. There is no guarding or rebound.   Musculoskeletal:         General: Normal range of motion.      Cervical back: Normal range of motion.   Skin:     General: Skin is warm and dry.      Capillary Refill: Capillary refill takes less than 2 seconds.      Findings: No rash.   Neurological:      General: No focal deficit present.      Mental Status: She is alert and oriented to person, place, and time. Mental status is at baseline.   Psychiatric:         Mood and Affect: Mood normal.         Behavior: Behavior normal.                Procedures:  Procedures      Medical Decision Making:      Comorbidities that affect care:    Left " foot pain, feet numbness, GERD, heel pain, hypertension, colon polyps, kidney stone    External Notes reviewed:    None      The following orders were placed and all results were independently analyzed by me:  Orders Placed This Encounter   Procedures    CT Abdomen Pelvis Without Contrast    [place] Draw    Comprehensive Metabolic Panel    Lipase    Urinalysis With Microscopic If Indicated (No Culture) - Urine, Clean Catch    hCG, Quantitative, Pregnancy    CBC Auto Differential    Urinalysis, Microscopic Only - Urine, Clean Catch    CBC & Differential    Green Top (Gel)    Lavender Top    Gold Top - SST    Light Blue Top       Medications Given in the Emergency Department:  Medications   sodium chloride 0.9 % bolus 500 mL (0 mL Intravenous Stopped 8/20/24 0325)   ondansetron (ZOFRAN) injection 4 mg (4 mg Intravenous Given 8/20/24 0255)   ketorolac (TORADOL) injection 30 mg (30 mg Intravenous Given 8/20/24 0255)   HYDROmorphone (DILAUDID) injection 1 mg (1 mg Intravenous Given 8/20/24 0405)        ED Course:    ED Course as of 08/20/24 0748   Tue Aug 20, 2024   0332   Study Result    Narrative & Impression  XR ABDOMEN KUB     Date of Exam: 8/9/2024 8:58 AM EDT     Indication: history of kidney stone, flank pain     Comparison: CT scan of the abdomen pelvis 4/27/2023.     Findings:  The lung bases are clear. The previously noted calcifications in the renal pyramids are better visualized on the prior CT scan of the abdomen. Pelvic phleboliths are present. There are no definite renal calcifications. No evidence of bowel obstruction.     IMPRESSION:  Impression:  Bilateral nephrolithiasis better visualized on the prior CT scan. No definite ureteral or urinary bladder calcifications are identified.        Electronically Signed: Regis Mg MD    8/11/2024 6:56 AM EDT    Workstation ID: [id number]     [TC]      ED Course User Index  [TC] Delia Rich APRN       Labs:    Lab Results (last 24 hours)       Procedure  Component Value Units Date/Time    CBC & Differential [[id number]]  (Abnormal) Collected: 08/20/24 0004    Specimen: Blood from Arm, Right Updated: 08/20/24 0015    Narrative:      The following orders were created for panel order CBC & Differential.  Procedure                               Abnormality         Status                     ---------                               -----------         ------                     CBC Auto Differential[[id number]]        Abnormal            Final result                 Please view results for these tests on the individual orders.    Comprehensive Metabolic Panel [[id number]]  (Abnormal) Collected: 08/20/24 0004    Specimen: Blood from Arm, Right Updated: 08/20/24 0033     Glucose 108 mg/dL      BUN 10 mg/dL      Creatinine 0.80 mg/dL      Sodium 139 mmol/L      Potassium 3.8 mmol/L      Comment: Slight hemolysis detected by analyzer. Result may be falsely elevated.        Chloride 105 mmol/L      CO2 23.6 mmol/L      Calcium 8.2 mg/dL      Total Protein 6.9 g/dL      Albumin 3.8 g/dL      ALT (SGPT) 13 U/L      AST (SGOT) 16 U/L      Alkaline Phosphatase 84 U/L      Total Bilirubin <0.2 mg/dL      Globulin 3.1 gm/dL      A/G Ratio 1.2 g/dL      BUN/Creatinine Ratio 12.5     Anion Gap 10.4 mmol/L      eGFR 95.7 mL/min/1.73     Narrative:      GFR Normal >60  Chronic Kidney Disease <60  Kidney Failure <15      Lipase [[id number]]  (Normal) Collected: 08/20/24 0004    Specimen: Blood from Arm, Right Updated: 08/20/24 0033     Lipase 40 U/L     hCG, Quantitative, Pregnancy [[id number]] Collected: 08/20/24 0004    Specimen: Blood from Arm, Right Updated: 08/20/24 0031     HCG Quantitative <0.50 mIU/mL     Narrative:      HCG Ranges by Gestational Age    Females - non-pregnant premenopausal   </= 1mIU/mL HCG  Females - postmenopausal               </= 7mIU/mL HCG    3 Weeks       5.4   -      72 mIU/mL  4 Weeks      10.2   -     708 mIU/mL  5 Weeks       217   -   8,245 mIU/mL  6 Weeks        152   -  32,177 mIU/mL  7 Weeks     4,059   - 153,767 mIU/mL  8 Weeks    31,366   - 149,094 mIU/mL  9 Weeks    59,109   - 135,901 mIU/mL  10 Weeks   44,186   - 170,409 mIU/mL  12 Weeks   27,107   - 201,615 mIU/mL  14 Weeks   24,302   -  93,646 mIU/mL  15 Weeks   12,540   -  69,747 mIU/mL  16 Weeks    8,904   -  55,332 mIU/mL  17 Weeks    8,240   -  51,793 mIU/mL  18 Weeks    9,649   -  55,271 mIU/mL      CBC Auto Differential [[id number]]  (Abnormal) Collected: 08/20/24 0004    Specimen: Blood from Arm, Right Updated: 08/20/24 0015     WBC 6.29 10*3/mm3      RBC 4.37 10*6/mm3      Hemoglobin 13.3 g/dL      Hematocrit 39.5 %      MCV 90.4 fL      MCH 30.4 pg      MCHC 33.7 g/dL      RDW 12.3 %      RDW-SD 40.5 fl      MPV 10.0 fL      Platelets 264 10*3/mm3      Neutrophil % 53.3 %      Lymphocyte % 29.3 %      Monocyte % 13.2 %      Eosinophil % 2.9 %      Basophil % 1.0 %      Immature Grans % 0.3 %      Neutrophils, Absolute 3.36 10*3/mm3      Lymphocytes, Absolute 1.84 10*3/mm3      Monocytes, Absolute 0.83 10*3/mm3      Eosinophils, Absolute 0.18 10*3/mm3      Basophils, Absolute 0.06 10*3/mm3      Immature Grans, Absolute 0.02 10*3/mm3      nRBC 0.0 /100 WBC     Urinalysis With Microscopic If Indicated (No Culture) - [[id number]]  (Abnormal) Collected: 08/20/24 0007    Specimen: Urine Updated: 08/20/24 0023     Color, UA Yellow     Appearance, UA Clear     pH, UA 6.0     Specific Gravity, UA 1.025     Glucose, UA Negative     Ketones, UA Negative     Bilirubin, UA Negative     Blood, UA Large (3+)     Protein, UA Trace     Leuk Esterase, UA Negative     Nitrite, UA Negative     Urobilinogen, UA 0.2 E.U./dL    Urinalysis, Microscopic Only - Urine, Clean Catch [[id number]]  (Abnormal) Collected: 08/20/24 0007    Specimen: Urine Updated: 08/20/24 0023     RBC, UA 21-50 /HPF      WBC, UA 3-5 /HPF      Bacteria, UA None Seen /HPF      Squamous Epithelial Cells, UA 0-2 /HPF      Hyaline Casts, UA 3-6 /LPF       None Methodology Automated Microscopy             Imaging:    CT Abdomen Pelvis Without Contrast    Result Date: 8/20/2024  CT ABDOMEN PELVIS WO CONTRAST Date of Exam: 8/20/2024 1:07 AM EDT Indication: Left-sided abdominal and flank pain with nausea. History of kidney stone and diverticulitis. Comparison: 4/7/2023 Technique: Axial CT images were obtained of the abdomen and pelvis without the administration of contrast. Reconstructed coronal and sagittal images were also obtained. Automated exposure control and iterative construction methods were used. Findings: Lung bases are clear. Abdominal aorta is normal in caliber. Gallbladder is normal. No biliary obstruction is seen. There are numerous bilateral nonobstructing kidney stones. Increased density in the medullary pyramids of both kidneys probably reflects medullary sponge kidney. There is mild left hydronephrosis due to a 4.3 mm stone at the UPJ. No other ureteral stones are seen. No right-sided hydronephrosis. The solid abdominal organs are otherwise normal. Urinary bladder is normal. Solid pelvic organs  are grossly normal. The cecum extends across the midline into the left lower quadrant. The appendix is normal. The large bowel is otherwise normal without evidence of acute colitis. No small bowel obstruction is identified. Stomach is normal. There is a small fat-containing  umbilical hernia that is similar to prior. No adenopathy or free fluid is seen.     1. Mild left hydronephrosis due to a 4.3 mm stone at the UPJ. 2. Numerous small bilateral nonobstructing kidney stones with evidence of bilateral medullary sponge kidney. 3. No acute findings in the GI tract. The appendix is normal. Electronically Signed: Wilder Humphrey MD  8/20/2024 1:45 AM EDT  Workstation ID: [id number]       Differential Diagnosis and Discussion:    Abdominal Pain: Based on the patient's signs and symptoms, I considered abdominal aortic aneurysm, small bowel obstruction, pancreatitis, acute  cholecystitis, acute appendecitis, peptic ulcer disease, gastritis, colitis, endocrine disorders, irritable bowel syndrome and other differential diagnosis an etiology of the patient's abdominal pain.  Back Pain: The patient presents with back pain. My differential diagnosis includes but is not limited to acute spinal epidural abscess, acute spinal epidural bleed, cauda equina syndrome, abdominal aortic aneurysm, aortic dissection, kidney stone, pyelonephritis, musculoskeletal back pain, spinal fracture, and osteoarthritis.     All labs were reviewed and interpreted by me.  CT scan radiology impression was interpreted by me.    MDM  Number of Diagnoses or Management Options  Left ureteral stone: new and requires workup  Renal colic on left side: new and requires workup     Amount and/or Complexity of Data Reviewed  Clinical lab tests: reviewed  Tests in the radiology section of CPT®: reviewed    Risk of Complications, Morbidity, and/or Mortality  Presenting problems: low  Diagnostic procedures: low  Management options: low    Patient Progress  Patient progress: stable         Patient Care Considerations:    ANTIBIOTICS: I considered prescribing antibiotics as an outpatient however no bacterial focus of infection was found.      Consultants/Shared Management Plan:    None    Social Determinants of Health:    Patient is independent, reliable, and has access to care.       Disposition and Care Coordination:    Discharged: The patient is suitable and stable for discharge with no need for consideration of admission.    I have explained the patient´s condition, diagnoses and treatment plan based on the information available to me at this time. I have answered questions and addressed any concerns. The patient has a good  understanding of the patient´s diagnosis, condition, and treatment plan as can be expected at this point. The vital signs have been stable. The patient´s condition is stable and appropriate for discharge  from the emergency department.      The patient will pursue further outpatient evaluation with the primary care physician or other designated or consulting physician as outlined in the discharge instructions. They are agreeable to this plan of care and follow-up instructions have been explained in detail. The patient has received these instructions in written format and has expressed an understanding of the discharge instructions. The patient is aware that any significant change in condition or worsening of symptoms should prompt an immediate return to this or the closest emergency department or call to 911.  I have explained discharge medications and the need for follow up with the patient/caretakers. This was also printed in the discharge instructions. Patient was discharged with the following medications and follow up:      Medication List        New Prescriptions      ketorolac 10 MG tablet  Commonly known as: TORADOL  Take 1 tablet by mouth Every 6 (Six) Hours As Needed for Moderate Pain.     ondansetron ODT 4 MG disintegrating tablet  Commonly known as: ZOFRAN-ODT  Place 1 tablet on the tongue 4 (Four) Times a Day As Needed for Nausea or Vomiting.     oxyCODONE-acetaminophen 5-325 MG per tablet  Commonly known as: PERCOCET  Take 1 tablet by mouth Every 6 (Six) Hours As Needed for Moderate Pain.     tamsulosin 0.4 MG capsule 24 hr capsule  Commonly known as: FLOMAX  Take 1 capsule by mouth Daily.               Where to Get Your Medications        These medications were sent to [facility]/pharmacy #[id number] - [place], [place] - [address] - [phone]  - [phone] FX  [address] [place] [address] [address]      Hours: 24-hours Phone: [phone]   ketorolac 10 MG tablet  ondansetron ODT 4 MG disintegrating tablet  oxyCODONE-acetaminophen 5-325 MG per tablet  tamsulosin 0.4 MG capsule 24 hr capsule      Jose Darby MD  [address]   [place] [id number] [phone]    Call today  FOR FOLLOW  UP    aRchael Waters, APRN  [address]  [facility] [address] [phone]      As needed, FOR FOLLOW UP       Final diagnoses:   Left ureteral stone   Renal colic on left side        ED Disposition       ED Disposition   Discharge    Condition   Stable    Comment   --               This medical record created using voice recognition software.             Delia Rich, APRN  08/20/24 5740

## 2024-09-12 ENCOUNTER — APPOINTMENT (OUTPATIENT)
Dept: INTERNAL MEDICINE | Facility: CLINIC | Age: 69
End: 2024-09-12
Payer: MEDICARE

## 2024-09-12 PROCEDURE — 36415 COLL VENOUS BLD VENIPUNCTURE: CPT

## 2024-09-13 LAB
25(OH)D3 SERPL-MCNC: 39.6 NG/ML
ALBUMIN SERPL ELPH-MCNC: 4.5 G/DL
ALP BLD-CCNC: 60 U/L
ALT SERPL-CCNC: 18 U/L
ANION GAP SERPL CALC-SCNC: 10 MMOL/L
AST SERPL-CCNC: 21 U/L
BILIRUB SERPL-MCNC: 0.2 MG/DL
BUN SERPL-MCNC: 21 MG/DL
CALCIUM SERPL-MCNC: 10.1 MG/DL
CHLORIDE SERPL-SCNC: 102 MMOL/L
CHOLEST SERPL-MCNC: 150 MG/DL
CO2 SERPL-SCNC: 26 MMOL/L
CREAT SERPL-MCNC: 0.56 MG/DL
EGFR: 99 ML/MIN/1.73M2
ESTIMATED AVERAGE GLUCOSE: 160 MG/DL
GLUCOSE SERPL-MCNC: 129 MG/DL
HBA1C MFR BLD HPLC: 7.2 %
HCT VFR BLD CALC: 39.6 %
HDLC SERPL-MCNC: 40 MG/DL
HGB BLD-MCNC: 12.6 G/DL
LDLC SERPL CALC-MCNC: 89 MG/DL
MCHC RBC-ENTMCNC: 31.8 GM/DL
MCHC RBC-ENTMCNC: 32.4 PG
MCV RBC AUTO: 101.8 FL
NONHDLC SERPL-MCNC: 110 MG/DL
PLATELET # BLD AUTO: 378 K/UL
POTASSIUM SERPL-SCNC: 5.5 MMOL/L
PROT SERPL-MCNC: 7.6 G/DL
RBC # BLD: 3.89 M/UL
RBC # FLD: 12.6 %
SODIUM SERPL-SCNC: 139 MMOL/L
T4 FREE SERPL-MCNC: 1.4 NG/DL
TRIGL SERPL-MCNC: 116 MG/DL
TSH SERPL-ACNC: 2.88 UIU/ML
WBC # FLD AUTO: 6.94 K/UL

## 2024-09-19 ENCOUNTER — APPOINTMENT (OUTPATIENT)
Dept: INTERNAL MEDICINE | Facility: CLINIC | Age: 69
End: 2024-09-19
Payer: MEDICARE

## 2024-09-19 VITALS
HEART RATE: 79 BPM | HEIGHT: 61 IN | SYSTOLIC BLOOD PRESSURE: 100 MMHG | OXYGEN SATURATION: 97 % | WEIGHT: 97 LBS | DIASTOLIC BLOOD PRESSURE: 60 MMHG | BODY MASS INDEX: 18.31 KG/M2

## 2024-09-19 DIAGNOSIS — E03.9 HYPOTHYROIDISM, UNSPECIFIED: ICD-10-CM

## 2024-09-19 DIAGNOSIS — E11.9 TYPE 2 DIABETES MELLITUS W/OUT COMPLICATIONS: ICD-10-CM

## 2024-09-19 DIAGNOSIS — M25.511 PAIN IN RIGHT SHOULDER: ICD-10-CM

## 2024-09-19 DIAGNOSIS — D64.9 ANEMIA, UNSPECIFIED: ICD-10-CM

## 2024-09-19 DIAGNOSIS — E78.00 PURE HYPERCHOLESTEROLEMIA, UNSPECIFIED: ICD-10-CM

## 2024-09-19 DIAGNOSIS — Z71.89 OTHER SPECIFIED COUNSELING: ICD-10-CM

## 2024-09-19 DIAGNOSIS — M25.512 PAIN IN RIGHT SHOULDER: ICD-10-CM

## 2024-09-19 DIAGNOSIS — Z00.00 ENCOUNTER FOR GENERAL ADULT MEDICAL EXAMINATION W/OUT ABNORMAL FINDINGS: ICD-10-CM

## 2024-09-19 PROCEDURE — G0439: CPT

## 2024-09-19 PROCEDURE — 93000 ELECTROCARDIOGRAM COMPLETE: CPT

## 2024-09-19 PROCEDURE — 99214 OFFICE O/P EST MOD 30 MIN: CPT | Mod: 25

## 2024-09-19 NOTE — ASSESSMENT
[FreeTextEntry1] : Discussed with patient importance of healthy diet, regular exercise (> 2.5 hours/week), and maintaining healthy weight. Diabetes.  A1c is slightly high.  Continue metformin 500 mg twice a day. CBC results from last week were reviewed with patient. Hyperlipidemia.  want HDL>51.   Triglyceride, HDL, and LDL results from last week were reviewed with patient. Hypothyroid.  Continue levothyroxine 50 mcg every 2 days alternating with 25 mcg every 2 days.  TSH and free T4 results from last week were reviewed with patient. Follow-up in 4 months COVID-19  booster vaccine is recommended once a year in fall.

## 2024-09-19 NOTE — HISTORY OF PRESENT ILLNESS
[FreeTextEntry1] : Follow-up for diabetes, anemia, hyperlipidemia, and hypothyroid [de-identified] :  See "CC" sheet

## 2024-09-19 NOTE — HEALTH RISK ASSESSMENT
[Good] : ~his/her~  mood as  good [No] : In the past 12 months have you used drugs other than those required for medical reasons? No [No falls in past year] : Patient reported no falls in the past year [0] : 2) Feeling down, depressed, or hopeless: Not at all (0) [de-identified] : walk [de-identified] : low fat/sugar [[id number]] : 0 [Never] : Never [Patient reported bone density results were normal] : Patient reported bone density results were normal [Patient reported colonoscopy was normal] : Patient reported colonoscopy was normal [HIV test declined] : HIV test declined [Change in mental status noted] : No change in mental status noted [Language] : denies difficulty with language [Behavior] : denies difficulty with behavior [Learning/Retaining New Information] : denies difficulty learning/retaining new information [Handling Complex Tasks] : denies difficulty handling complex tasks [Reasoning] : denies difficulty with reasoning [Spatial Ability and Orientation] : denies difficulty with spatial ability and orientation [None] : None [With Family] : lives with family [Retired] : retired [] :  [Feels Safe at Home] : Feels safe at home [Fully functional (bathing, dressing, toileting, transferring, walking, feeding)] : Fully functional (bathing, dressing, toileting, transferring, walking, feeding) [Fully functional (using the telephone, shopping, preparing meals, housekeeping, doing laundry, using] : Fully functional and needs no help or supervision to perform IADLs (using the telephone, shopping, preparing meals, housekeeping, doing laundry, using transportation, managing medications and managing finances) [Smoke Detector] : smoke detector [Seat Belt] :  uses seat belt [Designated Healthcare Proxy] : Designated healthcare proxy [Name: ___] : Health Care Proxy's Name: [unfilled]  [Relationship: ___] : Relationship: [unfilled]

## 2024-09-19 NOTE — HISTORY OF PRESENT ILLNESS
[FreeTextEntry1] : Follow-up for diabetes, anemia, hyperlipidemia, and hypothyroid [de-identified] :  See "CC" sheet

## 2024-09-19 NOTE — HEALTH RISK ASSESSMENT
[Good] : ~his/her~  mood as  good [No] : In the past 12 months have you used drugs other than those required for medical reasons? No [No falls in past year] : Patient reported no falls in the past year [0] : 2) Feeling down, depressed, or hopeless: Not at all (0) [de-identified] : walk [de-identified] : low fat/sugar [[id number]] : 0 [Never] : Never [Patient reported bone density results were normal] : Patient reported bone density results were normal [Patient reported colonoscopy was normal] : Patient reported colonoscopy was normal [HIV test declined] : HIV test declined [Change in mental status noted] : No change in mental status noted [Language] : denies difficulty with language [Behavior] : denies difficulty with behavior [Learning/Retaining New Information] : denies difficulty learning/retaining new information [Handling Complex Tasks] : denies difficulty handling complex tasks [Reasoning] : denies difficulty with reasoning [Spatial Ability and Orientation] : denies difficulty with spatial ability and orientation [None] : None [With Family] : lives with family [Retired] : retired [] :  [Feels Safe at Home] : Feels safe at home [Fully functional (bathing, dressing, toileting, transferring, walking, feeding)] : Fully functional (bathing, dressing, toileting, transferring, walking, feeding) [Fully functional (using the telephone, shopping, preparing meals, housekeeping, doing laundry, using] : Fully functional and needs no help or supervision to perform IADLs (using the telephone, shopping, preparing meals, housekeeping, doing laundry, using transportation, managing medications and managing finances) [Smoke Detector] : smoke detector [Seat Belt] :  uses seat belt [Designated Healthcare Proxy] : Designated healthcare proxy [Name: ___] : Health Care Proxy's Name: [unfilled]  [Relationship: ___] : Relationship: [unfilled]

## 2024-09-25 ENCOUNTER — RX RENEWAL (OUTPATIENT)
Age: 69
End: 2024-09-25

## 2024-11-13 ENCOUNTER — NON-APPOINTMENT (OUTPATIENT)
Age: 69
End: 2024-11-13

## 2025-01-10 ENCOUNTER — APPOINTMENT (OUTPATIENT)
Dept: INTERNAL MEDICINE | Facility: CLINIC | Age: 70
End: 2025-01-10
Payer: MEDICARE

## 2025-01-10 DIAGNOSIS — D64.9 ANEMIA, UNSPECIFIED: ICD-10-CM

## 2025-01-10 PROCEDURE — 36415 COLL VENOUS BLD VENIPUNCTURE: CPT

## 2025-01-11 LAB
ALBUMIN SERPL ELPH-MCNC: 4.3 G/DL
ALP BLD-CCNC: 61 U/L
ALT SERPL-CCNC: 13 U/L
ANION GAP SERPL CALC-SCNC: 12 MMOL/L
AST SERPL-CCNC: 20 U/L
BILIRUB SERPL-MCNC: 0.2 MG/DL
BUN SERPL-MCNC: 23 MG/DL
CALCIUM SERPL-MCNC: 10 MG/DL
CHLORIDE SERPL-SCNC: 104 MMOL/L
CHOLEST SERPL-MCNC: 144 MG/DL
CO2 SERPL-SCNC: 25 MMOL/L
CREAT SERPL-MCNC: 0.58 MG/DL
EGFR: 98 ML/MIN/1.73M2
ESTIMATED AVERAGE GLUCOSE: 157 MG/DL
FOLATE SERPL-MCNC: >20 NG/ML
GLUCOSE SERPL-MCNC: 126 MG/DL
HBA1C MFR BLD HPLC: 7.1 %
HCT VFR BLD CALC: 38.6 %
HDLC SERPL-MCNC: 38 MG/DL
HGB BLD-MCNC: 12.2 G/DL
LDLC SERPL CALC-MCNC: 85 MG/DL
MCHC RBC-ENTMCNC: 31.6 G/DL
MCHC RBC-ENTMCNC: 31.9 PG
MCV RBC AUTO: 101 FL
NONHDLC SERPL-MCNC: 107 MG/DL
PLATELET # BLD AUTO: 377 K/UL
POTASSIUM SERPL-SCNC: 4.8 MMOL/L
PROT SERPL-MCNC: 7.6 G/DL
RBC # BLD: 3.82 M/UL
RBC # FLD: 12.7 %
SODIUM SERPL-SCNC: 141 MMOL/L
T4 FREE SERPL-MCNC: 1.4 NG/DL
TRIGL SERPL-MCNC: 119 MG/DL
TSH SERPL-ACNC: 2.25 UIU/ML
VIT B12 SERPL-MCNC: 658 PG/ML
WBC # FLD AUTO: 8.93 K/UL

## 2025-01-21 ENCOUNTER — APPOINTMENT (OUTPATIENT)
Dept: INTERNAL MEDICINE | Facility: CLINIC | Age: 70
End: 2025-01-21
Payer: MEDICARE

## 2025-01-21 VITALS
OXYGEN SATURATION: 98 % | DIASTOLIC BLOOD PRESSURE: 60 MMHG | WEIGHT: 100 LBS | HEART RATE: 87 BPM | HEIGHT: 61 IN | SYSTOLIC BLOOD PRESSURE: 110 MMHG | BODY MASS INDEX: 18.88 KG/M2

## 2025-01-21 DIAGNOSIS — E11.9 TYPE 2 DIABETES MELLITUS W/OUT COMPLICATIONS: ICD-10-CM

## 2025-01-21 DIAGNOSIS — E03.9 HYPOTHYROIDISM, UNSPECIFIED: ICD-10-CM

## 2025-01-21 DIAGNOSIS — E78.5 HYPERLIPIDEMIA, UNSPECIFIED: ICD-10-CM

## 2025-01-21 DIAGNOSIS — Z86.39 PERSONAL HISTORY OF OTHER ENDOCRINE, NUTRITIONAL AND METABOLIC DISEASE: ICD-10-CM

## 2025-01-21 DIAGNOSIS — D64.9 ANEMIA, UNSPECIFIED: ICD-10-CM

## 2025-01-21 PROCEDURE — 99214 OFFICE O/P EST MOD 30 MIN: CPT

## 2025-01-21 PROCEDURE — G2211 COMPLEX E/M VISIT ADD ON: CPT

## 2025-02-13 ENCOUNTER — NON-APPOINTMENT (OUTPATIENT)
Age: 70
End: 2025-02-13

## 2025-02-20 ENCOUNTER — APPOINTMENT (OUTPATIENT)
Dept: INTERNAL MEDICINE | Facility: CLINIC | Age: 70
End: 2025-02-20
Payer: MEDICARE

## 2025-02-20 VITALS
WEIGHT: 98 LBS | HEIGHT: 61 IN | HEART RATE: 86 BPM | SYSTOLIC BLOOD PRESSURE: 100 MMHG | BODY MASS INDEX: 18.5 KG/M2 | DIASTOLIC BLOOD PRESSURE: 56 MMHG | OXYGEN SATURATION: 97 %

## 2025-02-20 DIAGNOSIS — E11.9 TYPE 2 DIABETES MELLITUS W/OUT COMPLICATIONS: ICD-10-CM

## 2025-02-20 DIAGNOSIS — N63.20 UNSPECIFIED LUMP IN THE LEFT BREAST, UNSPECIFIED QUADRANT: ICD-10-CM

## 2025-02-20 DIAGNOSIS — E03.9 HYPOTHYROIDISM, UNSPECIFIED: ICD-10-CM

## 2025-02-20 DIAGNOSIS — E78.5 HYPERLIPIDEMIA, UNSPECIFIED: ICD-10-CM

## 2025-02-20 PROCEDURE — 99214 OFFICE O/P EST MOD 30 MIN: CPT

## 2025-02-20 PROCEDURE — G2211 COMPLEX E/M VISIT ADD ON: CPT

## 2025-04-23 ENCOUNTER — RX RENEWAL (OUTPATIENT)
Age: 70
End: 2025-04-23

## 2025-06-05 ENCOUNTER — APPOINTMENT (OUTPATIENT)
Dept: INTERNAL MEDICINE | Facility: CLINIC | Age: 70
End: 2025-06-05

## 2025-07-03 ENCOUNTER — APPOINTMENT (OUTPATIENT)
Dept: INTERNAL MEDICINE | Facility: CLINIC | Age: 70
End: 2025-07-03
Payer: MEDICARE

## 2025-07-03 VITALS
HEART RATE: 84 BPM | SYSTOLIC BLOOD PRESSURE: 100 MMHG | BODY MASS INDEX: 18.5 KG/M2 | OXYGEN SATURATION: 99 % | HEIGHT: 61 IN | WEIGHT: 98 LBS | DIASTOLIC BLOOD PRESSURE: 40 MMHG

## 2025-07-03 PROCEDURE — 99214 OFFICE O/P EST MOD 30 MIN: CPT

## 2025-07-03 PROCEDURE — 36415 COLL VENOUS BLD VENIPUNCTURE: CPT

## 2025-07-03 PROCEDURE — G2211 COMPLEX E/M VISIT ADD ON: CPT

## 2025-07-07 LAB
ALBUMIN SERPL ELPH-MCNC: 4.7 G/DL
ALP BLD-CCNC: 57 U/L
ALT SERPL-CCNC: 26 U/L
ANION GAP SERPL CALC-SCNC: 14 MMOL/L
AST SERPL-CCNC: 31 U/L
BILIRUB SERPL-MCNC: 0.3 MG/DL
BUN SERPL-MCNC: 17 MG/DL
CALCIUM SERPL-MCNC: 10.5 MG/DL
CHLORIDE SERPL-SCNC: 98 MMOL/L
CHOLEST SERPL-MCNC: 197 MG/DL
CO2 SERPL-SCNC: 24 MMOL/L
CREAT SERPL-MCNC: 0.63 MG/DL
EGFRCR SERPLBLD CKD-EPI 2021: 96 ML/MIN/1.73M2
ESTIMATED AVERAGE GLUCOSE: 169 MG/DL
GLUCOSE SERPL-MCNC: 105 MG/DL
HBA1C MFR BLD HPLC: 7.5 %
HCT VFR BLD CALC: 39.2 %
HDLC SERPL-MCNC: 46 MG/DL
HGB BLD-MCNC: 12.9 G/DL
LDLC SERPL-MCNC: 130 MG/DL
MCHC RBC-ENTMCNC: 32.6 PG
MCHC RBC-ENTMCNC: 32.9 G/DL
MCV RBC AUTO: 99 FL
NONHDLC SERPL-MCNC: 151 MG/DL
PLATELET # BLD AUTO: 339 K/UL
POTASSIUM SERPL-SCNC: 5 MMOL/L
PROT SERPL-MCNC: 8 G/DL
RBC # BLD: 3.96 M/UL
RBC # FLD: 12.5 %
SODIUM SERPL-SCNC: 137 MMOL/L
T4 FREE SERPL-MCNC: 1.3 NG/DL
TRIGL SERPL-MCNC: 116 MG/DL
TSH SERPL-ACNC: 4.68 UIU/ML
WBC # FLD AUTO: 7.26 K/UL

## 2025-07-14 ENCOUNTER — NON-APPOINTMENT (OUTPATIENT)
Age: 70
End: 2025-07-14

## 2025-07-14 RX ORDER — ATORVASTATIN CALCIUM 10 MG/1
10 TABLET, FILM COATED ORAL
Qty: 90 | Refills: 2 | Status: ACTIVE | COMMUNITY
Start: 2025-07-14 | End: 1900-01-01

## 2025-07-21 ENCOUNTER — NON-APPOINTMENT (OUTPATIENT)
Age: 70
End: 2025-07-21